# Patient Record
Sex: FEMALE | Race: WHITE
[De-identification: names, ages, dates, MRNs, and addresses within clinical notes are randomized per-mention and may not be internally consistent; named-entity substitution may affect disease eponyms.]

---

## 2017-03-10 ENCOUNTER — HOSPITAL ENCOUNTER (INPATIENT)
Dept: HOSPITAL 31 - C.ER | Age: 22
LOS: 10 days | Discharge: HOME | DRG: 430 | End: 2017-03-20
Attending: PSYCHIATRY & NEUROLOGY | Admitting: PSYCHIATRY & NEUROLOGY
Payer: MEDICAID

## 2017-03-10 DIAGNOSIS — F12.10: ICD-10-CM

## 2017-03-10 DIAGNOSIS — Z91.19: ICD-10-CM

## 2017-03-10 DIAGNOSIS — F25.0: Primary | ICD-10-CM

## 2017-03-10 LAB
ALBUMIN/GLOB SERPL: 1.2 {RATIO} (ref 1–2.1)
ALP SERPL-CCNC: 43 U/L (ref 38–126)
ALT SERPL-CCNC: 28 U/L (ref 9–52)
AST SERPL-CCNC: 17 U/L (ref 14–36)
BACTERIA #/AREA URNS HPF: (no result) /[HPF]
BASOPHILS # BLD AUTO: 0 K/UL (ref 0–0.2)
BASOPHILS NFR BLD: 0.6 % (ref 0–2)
BILIRUB SERPL-MCNC: 0.5 MG/DL (ref 0.2–1.3)
BILIRUB UR-MCNC: NEGATIVE MG/DL
BUN SERPL-MCNC: 9 MG/DL (ref 7–17)
CALCIUM SERPL-MCNC: 8.6 MG/DL (ref 8.6–10.4)
CHLORIDE SERPL-SCNC: 97 MMOL/L (ref 98–107)
CO2 SERPL-SCNC: 30 MMOL/L (ref 22–30)
EOSINOPHIL # BLD AUTO: 0.1 K/UL (ref 0–0.7)
EOSINOPHIL NFR BLD: 1.1 % (ref 0–4)
ERYTHROCYTE [DISTWIDTH] IN BLOOD BY AUTOMATED COUNT: 12.5 % (ref 11.5–14.5)
ETHANOL SERPL-MCNC: < 10 MG/DL (ref 0–10)
GLOBULIN SER-MCNC: 3.3 GM/DL (ref 2.2–3.9)
GLUCOSE SERPL-MCNC: 92 MG/DL (ref 65–105)
GLUCOSE UR STRIP-MCNC: NORMAL MG/DL
HCT VFR BLD CALC: 35.7 % (ref 34–47)
KETONES UR STRIP-MCNC: (no result) MG/DL
LEUKOCYTE ESTERASE UR-ACNC: (no result) LEU/UL
LYMPHOCYTES # BLD AUTO: 2.5 K/UL (ref 1–4.3)
LYMPHOCYTES NFR BLD AUTO: 30.3 % (ref 20–40)
MCH RBC QN AUTO: 30.7 PG (ref 27–31)
MCHC RBC AUTO-ENTMCNC: 35.1 G/DL (ref 33–37)
MCV RBC AUTO: 87.6 FL (ref 81–99)
MONOCYTES # BLD: 0.9 K/UL (ref 0–0.8)
MONOCYTES NFR BLD: 11.1 % (ref 0–10)
NRBC BLD AUTO-RTO: 0 % (ref 0–2)
PH UR STRIP: 6 [PH] (ref 5–8)
PLATELET # BLD: 305 K/UL (ref 130–400)
PMV BLD AUTO: 7.7 FL (ref 7.2–11.7)
POTASSIUM SERPL-SCNC: 3.6 MMOL/L (ref 3.6–5.2)
PROT SERPL-MCNC: 7.1 G/DL (ref 6.3–8.3)
PROT UR STRIP-MCNC: (no result) MG/DL
RBC # UR STRIP: NEGATIVE /UL
RBC #/AREA URNS HPF: 1 /HPF (ref 0–3)
SODIUM SERPL-SCNC: 141 MMOL/L (ref 132–148)
SP GR UR STRIP: 1.02 (ref 1–1.03)
UROBILINOGEN UR-MCNC: NORMAL MG/DL (ref 0.2–1)
WBC # BLD AUTO: 8.2 K/UL (ref 4.8–10.8)
WBC #/AREA URNS HPF: 1 /HPF (ref 0–5)

## 2017-03-10 PROCEDURE — GZ72ZZZ FAMILY PSYCHOTHERAPY: ICD-10-PCS | Performed by: PSYCHIATRY & NEUROLOGY

## 2017-03-10 PROCEDURE — GZ56ZZZ INDIVIDUAL PSYCHOTHERAPY, SUPPORTIVE: ICD-10-PCS | Performed by: PSYCHIATRY & NEUROLOGY

## 2017-03-10 PROCEDURE — GZ3ZZZZ MEDICATION MANAGEMENT: ICD-10-PCS | Performed by: PSYCHIATRY & NEUROLOGY

## 2017-03-11 VITALS — OXYGEN SATURATION: 100 %

## 2017-03-11 RX ADMIN — DIVALPROEX SODIUM SCH MG: 500 TABLET, EXTENDED RELEASE ORAL at 18:35

## 2017-03-11 NOTE — RAD
PROCEDURE:  CHEST RADIOGRAPH, 1 VIEW



HISTORY:

medical screening



COMPARISON:

None available.



FINDINGS:



LUNGS:

Clear.



PLEURA:

No pneumothorax or pleural fluid seen.



CARDIOVASCULAR:

Normal.



OSSEOUS STRUCTURES:

No significant abnormalities.



VISUALIZED UPPER ABDOMEN:

Normal.



OTHER FINDINGS:

None. 



IMPRESSION:

No active disease.

## 2017-03-11 NOTE — PCM.PSYCH
Initial Psychiatric Evaluation





- Initial Psychiatric Evaluation


Type of Admission: Voluntary


Legal Status: Capacity


Chief Complaint (in patient's own words): 


"I'm sleepy"


History of Present Illness and Precipitating Events: 


The patient is seen, chart reviewed and case discussed.


This is a 21-year-old  female, single, no child, unemployed, brought here 

by her family. The patient was living with her brother in [place] for the last 4 

months and getting treatment, but she suddenly came back to [place] without 

letting her family and off. 


Her brother is on vacation and could not be reached.





Her family brought her in because she was acting bizarre, banging her head on 

the wall and getting aggressive. She was also almost catatonic in ER. They 

reported that the patient has a chronic mental illness, schizophrenia or 

bipolar disorder, and was on a depot medication once a month which they don't 

know the name. They do not know why she left [place] but the patient last night 

said that she was upset about an ex-boyfriend getting . However again is 

not clear why she left [place]. The patient would not elaborate to the writer 

either.


The patient states "no" or "I don't know" to most questions including 

suicidality and homicidality and hallucinations/delusions. However, she looks 

internally preoccupied, is our and inappropriate such as she disrobe in the 

hallway and she was also aggressive with staff at times.


Unknown drug hx but UDS is negative





Past psych history: Patient has been hospitalized in the past. Her medications 

are unknown at this point.





Family psych history: Unknown





Medical history: Unknown


Current Medications: 





Active Medications











Generic Name Dose Route Start Last Admin





  Trade Name Freq  PRN Reason Stop Dose Admin


 


Benztropine Mesylate  0.5 mg 03/11/17 10:00 03/11/17 09:31





  Cogentin  PO   0.5 mg





  BID PASQUALE   Administration


 


Divalproex Sodium  500 mg 03/11/17 18:00  





  Depakote Er  PO   





  BID PASQUALE   


 


Haloperidol  5 mg 03/11/17 10:00 03/11/17 09:31





  Haldol  PO   5 mg





  BID PASQUALE   Administration


 


Haloperidol  5 mg 03/11/17 08:31  





  Haldol  PO   





  Q2H PRN   





  agitation, max 4x/24h   


 


Hydroxyzine HCl  25 mg 03/11/17 08:29  





  Atarax  PO   





  Q4H PRN   





  Anxiety   


 


Ibuprofen  600 mg 03/11/17 08:29  





  Motrin Tab  PO   





  Q6H PRN   





  Pain, moderate (4-7)   


 


Lorazepam  1 mg 03/11/17 10:00 03/11/17 09:37





  Ativan  PO   1 mg





  BID PASQUALE   Administration


 


Quetiapine Fumarate  100 mg 03/11/17 22:00  





  Seroquel  PO   





  HS PASQUALE   


 


Trazodone HCl  50 mg 03/11/17 08:29  





  Desyrel  PO   





  HS PRN   





  Insomnia   














Past Psychiatric History





- Past Psychiatric History


Previous Treatment History: Inpatient


Pertinent Medical Hx (Current Medical&Sleep Prob, Allergies): 





 Allergies











Allergy/AdvReac Type Severity Reaction Status Date / Time


 


No Known Allergies Allergy   Verified 11/29/15 08:18








 





Famotidine [Pepcid AC] 20 mg PO DAILY #20 ctb 11/29/15 


Haloperidol Decanoate [Haldol Decanoate--long acting] 1 ml IM ONCE 11/29/15 


Lithium 1 tab PO BID 11/29/15 











Review of Systems





- Neurological


Neurological: Confusion





- Psychiatric


Psychiatric: Abnormal Sleep Pattern, Irritability.  absent: Homicidal Ideation, 

Suicidal Ideation





Mental Status Examination





- Personal Presentation


Personal Presentation: Looks stated age





- Affect


Affect: Flat





- Motor Activity


Motor Activity: Psychomotor Agitation





- Reliability in Providing Information


Reliability in Providing Information: Poor, due to alteration in thoughts





- Speech


Speech: Disorganized, Irrelevant





- Mood


Mood: Anxious





- Formal Thought Process


Formal Thought Process: Loosening of associations





- Cognitive Functions


Orientation: Situation (cannot be assessed due to no cooperation)


Attention/Concentration: Easily distracted


Abstract Thinking: [place]


Estimate of Intelligence: Below average


Judgement: Imparied, as evidence by: Poor judgement


Memory: Recent impaired, as evidence by: Inability to recall events of the day, 

Remote impaired as evidenced by: Inability to recall sig life events





- Risk


Risk: Diminished functioning, Other (catatonia)





- Strength & Assets Inventory


Strength & Assets Inventory: Family support





DSM 5 DX





- DSM 5


DSM 5 Diagnosis: 


Schizoaffective d/o - bipolar type





- Recommended/Plan of Treatment


Treatment Recommendations and Plan of Treatment: 


Haldol 5 mg BID for psychosis


Depakote  mg BID for mood sxs


Cogentin 0.5 mg BID for SEs


Ativan 1 mg BID for catatonic sxs/anxiety


Seroquel 50 hs for insomnia





Collateral info from family re her depot med


Close observation


Support and psychoed


family meeting


Refer to [facility] on d/c





33 min


Projected ELOS: 7 days


Prognosis: good with treatment


Discharge Plan and Discharge Criteria: 


No psychosis or frederic, no catatonic sxs


refer to DTP

## 2017-03-12 RX ADMIN — DIVALPROEX SODIUM SCH MG: 500 TABLET, EXTENDED RELEASE ORAL at 17:34

## 2017-03-12 RX ADMIN — DIVALPROEX SODIUM SCH MG: 500 TABLET, EXTENDED RELEASE ORAL at 09:08

## 2017-03-12 NOTE — PCM.PYCHPN
Psychiatric Progress Note





- Psychiatric Progress Note


Patient seen today, length of contact: 16 min


Patient Chief Complaint: 


"I'm OK"


Problems Identified/Issues Discussed: 


The pt is seen, chart reviewed, case discussed with staff.


The pt is compliant with medications and reports no side-effects.


Symptoms are improving slowly but needs more time to stabilize. 


She is however still disorganized, less catatonic. Singing loudly in the 

hallways - redirectable. 


She claimed she came on her own but a friend helped her (from [place] to [place]) and 

that she will NOT go back and will stay with her mother..


She is OK with attending [facility].


Medication Change: Yes (adjust ativan)


Medical Record Reviewed: Yes





Mental Status Examination





- Cognitive Function


Orientation: Person, Place, Situation, Time


Memory: Impaired


Attention: Poor


Concentration: Poor


Association: Loose


Fund of Knowledge: Poor





- Mood


Mood: Anxious





- Affect


Affect: Constricted





- Speech


Speech: Loud





- Formal Thought Process


Formal Thought Process: Paranoia, Loosening of associations





Goal/Treatment Plan





- Goal/Treatment Plan


Need for Continued Stay: Discharge may exacerbated symptoms, Severe functional 

impairment


Progress Toward Problem(s) and Goals/Treatment Plan: 


Haldol 5 mg BID for psychosis


Depakote  mg BID for mood sxs


Cogentin 0.5 mg BID for SEs


Ativan 0.5 mg TID for catatonic sxs/anxiety


Seroquel 50 hs for insomnia





Collateral info from family re her depot med


Close observation


Support and psychoed


family meeting


Refer to [facility] on d/c





Estimated Date of D/C: 03/17/17

## 2017-03-13 RX ADMIN — DIVALPROEX SODIUM SCH MG: 500 TABLET, EXTENDED RELEASE ORAL at 18:14

## 2017-03-13 RX ADMIN — DIVALPROEX SODIUM SCH MG: 500 TABLET, EXTENDED RELEASE ORAL at 09:32

## 2017-03-13 NOTE — PCM.PYCHPN
Psychiatric Progress Note





- Psychiatric Progress Note


Patient seen today, length of contact: 16 min


Patient Chief Complaint: 


I want to leave


Problems Identified/Issues Discussed: 





Patient seen and evaluated, chart reviewed and discussed with the nurse. 

Patient remained disorganized and internally preoccupied. Patient reports 

irritability, anxiety and agitation. Patient still appears paranoid and 

delusional. She denies hearing any voices. She is taking medication and denied 

any side effects.





Supportive therapy and psychoeducation were given.


Medication Change: Yes (Increase Haldol)


Medical Record Reviewed: Yes





Mental Status Examination





- Cognitive Function


Orientation: Person, Place, Situation, Time


Memory: Impaired


Attention: Poor


Concentration: Poor


Association: Loose


Fund of Knowledge: Poor





- Mood


Mood: Anxious





- Affect


Affect: Constricted





- Speech


Speech: Loud





- Formal Thought Process


Formal Thought Process: Delusions, Paranoia, Loosening of associations





- Suicidal Ideation


Suicidal Ideation: No





- Homicidal Ideation


Homicidal Ideation: No





Goal/Treatment Plan





- Goal/Treatment Plan


Need for Continued Stay: Discharge may exacerbated symptoms, Severe functional 

impairment


Progress Toward Problem(s) and Goals/Treatment Plan: 


 Schizoaffective disorder bipolar type


 CBT


Psychoeducation


Supportive therapy


Haldol 5 mg PO Daily


Increase Haldol 10 mg at bedtime


Depakote  mg BID for mood sxs


Cogentin 0.5 mg BID for SEs


Ativan 1 mg BID for catatonic sxs/anxiety


Seroquel 50 hs for insomnia





Collateral info from family re her depot med


Close observation


Support and psychoed


family meeting


Refer to [facility] on d/c





Estimated Date of D/C: 03/17/17





- Smoking Cessation


Smoking Cessation Initiated: No

## 2017-03-14 RX ADMIN — DIVALPROEX SODIUM SCH MG: 500 TABLET, EXTENDED RELEASE ORAL at 17:21

## 2017-03-14 RX ADMIN — DIVALPROEX SODIUM SCH MG: 500 TABLET, EXTENDED RELEASE ORAL at 10:51

## 2017-03-14 NOTE — CARD
--------------- APPROVED REPORT --------------





EKG Measurement

Heart [id number]

[id number] [id number]

[id number]

[id number]

[id number]



<Conclusion>

Sinus bradycardia

Early repolarization

Otherwise normal ECG

## 2017-03-14 NOTE — PCM.PYCHPN
Psychiatric Progress Note





- Psychiatric Progress Note


Patient seen today, length of contact: 17 min


Patient Chief Complaint: 


I'm feeling better


Problems Identified/Issues Discussed: 





Patient seen and evaluated, chart reviewed and discussed with the nurse. As per 

the staff patient is taking her medications but still wandering in the 

hallways. Today patient appeared a bit organized and kempt, but remained 

internally preoccupied. Patient reports improvement in her mood  but still 

appears paranoid and delusional. She is taking medication and denied any side 

effects.





Supportive therapy and psychoeducation were given.


Medication Change: Yes (Increase Haldol)


Medical Record Reviewed: Yes





Mental Status Examination





- Cognitive Function


Orientation: Person, Place, Situation, Time


Memory: Impaired


Attention: Poor


Concentration: Poor


Association: Loose


Fund of Knowledge: Poor





- Mood


Mood: Anxious





- Affect


Affect: Constricted





- Speech


Speech: Loud





- Formal Thought Process


Formal Thought Process: Delusions, Paranoia, Loosening of associations





- Suicidal Ideation


Suicidal Ideation: No





- Homicidal Ideation


Homicidal Ideation: No





Goal/Treatment Plan





- Goal/Treatment Plan


Need for Continued Stay: Discharge may exacerbated symptoms, Severe functional 

impairment


Progress Toward Problem(s) and Goals/Treatment Plan: 


 Schizoaffective disorder bipolar type


 CBT


Psychoeducation


Supportive therapy


Increase Haldol 10 mg PO Daily


Haldol 10 mg at bedtime


Depakote  mg BID for mood sxs


Cogentin 0.5 mg BID for SEs


Ativan 1 mg BID for catatonic sxs/anxiety


Seroquel 50 hs for insomnia





Collateral info from family re her depot med


Close observation


Support and psychoed


family meeting


Refer to [facility] on d/c





Estimated Date of D/C: 03/17/17





- Smoking Cessation


Smoking Cessation Initiated: No

## 2017-03-15 RX ADMIN — DIVALPROEX SODIUM SCH MG: 500 TABLET, EXTENDED RELEASE ORAL at 17:02

## 2017-03-15 RX ADMIN — DIVALPROEX SODIUM SCH MG: 500 TABLET, EXTENDED RELEASE ORAL at 10:14

## 2017-03-15 NOTE — PCM.PYCHPN
Psychiatric Progress Note





- Psychiatric Progress Note


Patient seen today, length of contact: 17 min


Patient Chief Complaint: 


I'm feeling better


Problems Identified/Issues Discussed: 





Patient seen and evaluated, chart reviewed and discussed with the nurse. As per 

the staff patient is taking her medications but still wandering in the 

hallways. Today patient appeared a bit organized and kempt, but remained 

internally preoccupied. Patient reports improvement in her mood  but still 

appears paranoid and delusional. She is taking medication and denied any side 

effects.





Supportive therapy and psychoeducation were given.


Medication Change: Yes (Increase Haldol)


Medical Record Reviewed: Yes





Mental Status Examination





- Cognitive Function


Orientation: Person, Place, Situation, Time


Memory: Impaired


Attention: Poor


Concentration: Poor


Association: Loose


Fund of Knowledge: Poor





- Mood


Mood: Anxious





- Affect


Affect: Constricted





- Speech


Speech: Loud





- Formal Thought Process


Formal Thought Process: Delusions, Paranoia, Loosening of associations





- Suicidal Ideation


Suicidal Ideation: No





- Homicidal Ideation


Homicidal Ideation: No





Goal/Treatment Plan





- Goal/Treatment Plan


Need for Continued Stay: Discharge may exacerbated symptoms, Severe functional 

impairment


Progress Toward Problem(s) and Goals/Treatment Plan: 


 Schizoaffective disorder bipolar type


 CBT


Psychoeducation


Supportive therapy


Increase Haldol 10 mg PO Daily


Haldol 10 mg at bedtime


Depakote  mg BID for mood sxs


Cogentin 0.5 mg BID for SEs


Ativan 1 mg BID for catatonic sxs/anxiety


Seroquel 50 hs for insomnia





Collateral info from family re her depot med


Close observation


Support and psychoed


family meeting


Refer to [facility] on d/c





Estimated Date of D/C: 03/17/17

## 2017-03-16 RX ADMIN — DIVALPROEX SODIUM SCH MG: 500 TABLET, EXTENDED RELEASE ORAL at 10:06

## 2017-03-16 RX ADMIN — DIVALPROEX SODIUM SCH MG: 500 TABLET, EXTENDED RELEASE ORAL at 17:23

## 2017-03-16 NOTE — PCM.PYCHPN
Psychiatric Progress Note





- Psychiatric Progress Note


Patient seen today, length of contact: 17 min


Patient Chief Complaint: 


I'm feeling much better


Problems Identified/Issues Discussed: 


Patient seen and evaluated, chart reviewed and discussed with the nurse. As per 

the staff patient is taking her medications and there were no noted episodes of 

psychosis. Today patient appeared organized and kempt, she was well dressed but 

remained internally preoccupied with a strong desire to return home to her 

mother's apartment. The pt denies continuing to have a constant flow of 

thoughts race through her head today.  Patient reports improvement in her mood  

but still appears paranoid and delusional. She is taking medication and denied 

any side effects.





Supportive therapy and psychoeducation were given.


Medication Change: Yes (Increase Haldol)


Medical Record Reviewed: Yes





Mental Status Examination





- Cognitive Function


Orientation: Person, Place, Situation, Time


Memory: Impaired


Attention: Poor


Concentration: Poor


Association: Loose


Fund of Knowledge: Poor





- Mood


Mood: Anxious





- Affect


Affect: Constricted





- Speech


Speech: Loud





- Formal Thought Process


Formal Thought Process: Delusions, Paranoia, Loosening of associations





- Suicidal Ideation


Suicidal Ideation: No





- Homicidal Ideation


Homicidal Ideation: No





Goal/Treatment Plan





- Goal/Treatment Plan


Need for Continued Stay: Discharge may exacerbated symptoms, Severe functional 

impairment


Progress Toward Problem(s) and Goals/Treatment Plan: 


 Schizoaffective disorder bipolar type


 CBT


Psychoeducation


Supportive therapy


Increase Haldol 10 mg PO Daily


Haldol 10 mg at bedtime


Depakote  mg BID for mood sxs


Cogentin 0.5 mg BID for SEs


Ativan 1 mg BID for catatonic sxs/anxiety


Seroquel 50 hs for insomnia





Collateral info from family re her depot med


Close observation


Support and psychoed


family meeting


Refer to [facility] on d/c





Estimated Date of D/C: 03/17/17

## 2017-03-17 RX ADMIN — DIVALPROEX SODIUM SCH MG: 500 TABLET, EXTENDED RELEASE ORAL at 17:04

## 2017-03-17 RX ADMIN — DIVALPROEX SODIUM SCH MG: 500 TABLET, EXTENDED RELEASE ORAL at 10:29

## 2017-03-17 NOTE — PCM.PYCHPN
Psychiatric Progress Note





- Psychiatric Progress Note


Patient seen today, length of contact: 17 min


Patient Chief Complaint: 


I'm feeling much better


Problems Identified/Issues Discussed: 


Patient seen and evaluated, chart reviewed and discussed with the nurse. As per 

the staff pt still appeared less delusional and less internally preoccupied. 

However, pt remained calm and cooperative. Patient reports improvement in the 

hallucinations but still reports, 'multiple voices in the head that are talking 

to her and are very mean to her.' Pt remained isolated, withdrawn and remained 

confined to his room. Patient still appears paranoid and delusional. She is 

taking medication and tolerating very well.





Spoke to the mother and informed her about the discharge plan on Monday.








Medication Change: No


Medical Record Reviewed: Yes





Mental Status Examination





- Cognitive Function


Orientation: Person, Place, Situation, Time


Memory: Impaired


Attention: WNL


Concentration: WNL


Association: Loose


Fund of Knowledge: Poor





- Mood


Mood: Anxious





- Affect


Affect: Constricted





- Speech


Speech: Loud





- Formal Thought Process


Formal Thought Process: Hallucinations, Paranoia, Loosening of associations





- Suicidal Ideation


Suicidal Ideation: No





- Homicidal Ideation


Homicidal Ideation: No





Goal/Treatment Plan





- Goal/Treatment Plan


Need for Continued Stay: Discharge may exacerbated symptoms, Severe functional 

impairment


Progress Toward Problem(s) and Goals/Treatment Plan: 


 Schizoaffective disorder bipolar type


 CBT


Psychoeducation


Supportive therapy


Haldol 10 mg PO Daily


Haldol 10 mg at bedtime


Depakote  mg BID for mood sxs


Cogentin 0.5 mg BID for SEs


Ativan 1 mg BID for catatonic sxs/anxiety


Seroquel 50 hs for insomnia





Collateral info from family re her depot med


Close observation


Support and psychoed


family meeting


Refer to [facility] on d/c





Estimated Date of D/C: 03/17/17





- Smoking Cessation


Smoking Cessation Initiated: No

## 2017-03-18 RX ADMIN — DIVALPROEX SODIUM SCH MG: 500 TABLET, EXTENDED RELEASE ORAL at 19:05

## 2017-03-18 RX ADMIN — DIVALPROEX SODIUM SCH MG: 500 TABLET, EXTENDED RELEASE ORAL at 10:22

## 2017-03-18 NOTE — PCM.PYCHPN
Psychiatric Progress Note





- Psychiatric Progress Note


Patient seen today, length of contact: 15 minutes


Patient Chief Complaint: 


I'm feeling better today


Problems Identified/Issues Discussed: 


Patient seen. Chart reviewed. Case discussed with the staff. Issues related to 

illness and treatment were discussed with the patient. Reported compliant with 

treatment with no adverse affects. Tolerating treatment very well. Patient 

reported feeling much better. According to staff, patient his more clear in her 

thoughts, less isolative and more cooperative. At the time of evaluation, 

patient was awake alert oriented 3, had no delusions, no auditory or visual 

hallucinations, no suicidal ideations or homicidal ideations.


Medical Problems: 


None reported


Diagnostic Results: 


Reviewed


DSM 5 Symptoms Update: 


Improving with treatment


Medication Change: No


Medical Record Reviewed: Yes





Mental Status Examination





- Cognitive Function


Orientation: Person, Place, Situation, Time


Memory: Intact


Attention: WNL


Concentration: WNL


Association: WNL


Fund of Knowledge: [facility]


Decription of patient's judgement and insights: 


Fair





- Mood


Mood: Anxious





- Affect


Affect: Constricted





- Speech


Speech: Soft





- Formal Thought Process


Formal Thought Process: Other





- Suicidal Ideation


Suicidal Ideation: No





- Homicidal Ideation


Homicidal Ideation: No





Goal/Treatment Plan





- Goal/Treatment Plan


Need for Continued Stay: Remain at risks for inpatient hospitalization, 

Discharge may exacerbated symptoms, Severe functional impairment


Progress Toward Problem(s) and Goals/Treatment Plan: 


Improving with treatment


Patient education


Supportive therapy


Continue treatment as before


Estimated Date of D/C: 03/20/17





- Smoking Cessation


Smoking Cessation Initiated: No

## 2017-03-19 RX ADMIN — DIVALPROEX SODIUM SCH MG: 500 TABLET, EXTENDED RELEASE ORAL at 17:33

## 2017-03-19 RX ADMIN — DIVALPROEX SODIUM SCH MG: 500 TABLET, EXTENDED RELEASE ORAL at 09:01

## 2017-03-19 NOTE — PCM.PYCHPN
Psychiatric Progress Note





- Psychiatric Progress Note


Patient seen today, length of contact: 15 minutes


Patient Chief Complaint: 


I'm feeling better. Can I go home today


Problems Identified/Issues Discussed: 


Patient seen. Chart reviewed. Case discussed with the staff. Issues related to 

illness and treatment were discussed with the patient. Reported compliant with 

treatment with no adverse affects. Tolerating treatment very well. Patient 

reported feeling much better. According to staff, patient his more clear in her 

thoughts, less isolative and more cooperative. At the time of evaluation, 

patient was awake alert oriented 3, had no delusions, no auditory or visual 

hallucinations, no suicidal ideations or homicidal ideations.


Medical Problems: 


None reported


Diagnostic Results: 


Reviewed


DSM 5 Symptoms Update: 


Improving with treatment


Medication Change: No


Medical Record Reviewed: Yes





Mental Status Examination





- Cognitive Function


Orientation: Person, Place, Situation, Time


Memory: Intact


Attention: WNL


Concentration: WNL


Association: WNL


Fund of Knowledge: [facility]


Decription of patient's judgement and insights: 


Fair





- Mood


Mood: Anxious





- Affect


Affect: Other (Appropriate)





- Speech


Speech: Soft





- Formal Thought Process


Formal Thought Process: No Impairment


Psychotic Thoughts and Behaviors: 


None





- Suicidal Ideation


Suicidal Ideation: No





- Homicidal Ideation


Homicidal Ideation: No





Goal/Treatment Plan





- Goal/Treatment Plan


Need for Continued Stay: Remain at risks for inpatient hospitalization, 

Discharge may exacerbated symptoms, Severe functional impairment


Progress Toward Problem(s) and Goals/Treatment Plan: 


Improving with treatment


Patient education


Supportive therapy


Continue treatment as before


Estimated Date of D/C: 03/20/17





- Smoking Cessation


Smoking Cessation Initiated: No

## 2017-03-20 VITALS
SYSTOLIC BLOOD PRESSURE: 98 MMHG | RESPIRATION RATE: 18 BRPM | TEMPERATURE: 96.9 F | HEART RATE: 69 BPM | DIASTOLIC BLOOD PRESSURE: 69 MMHG

## 2017-03-20 RX ADMIN — DIVALPROEX SODIUM SCH MG: 500 TABLET, EXTENDED RELEASE ORAL at 09:35

## 2017-03-20 NOTE — PCM.PYCHDC
Mental Status Examination





- Mental Status Examination


Orientation: Person, Place, Situation, Time


Memory: Intact


Mood: Neutral


Affect: Constricted


Speech: Soft


Attention: WNL


Concentration: WNL


Association: WNL


Fund of Knowledge: WNL


Formal Thought Process: No Impairment


Description of patient's judgement and insight: 


good, fair


Psychotic Thoughts and Behaviors: 


denies any AVH


Suicidal Ideation: No


Current Homicidal Ideation?: No





Discharge Summary





- Discharge Note


Reason for Hospitalization: 


This is a 21-year-old  female, single, no child, unemployed, brought here 

by her family. The patient was living with her brother in [place] for the last 4 

months and getting treatment, but she suddenly came back to [place] without 

letting her family and off. 


Her brother is on vacation and could not be reached.





Her family brought her in because she was acting bizarre, banging her head on 

the wall and getting aggressive. She was also almost catatonic in ER. They 

reported that the patient has a chronic mental illness, schizophrenia or 

bipolar disorder, and was on a depot medication once a month which they don't 

know the name. They do not know why she left [place] but the patient last night 

said that she was upset about an ex-boyfriend getting . However again is 

not clear why she left [place]. The patient would not elaborate to the writer 

either.


The patient states "no" or "I don't know" to most questions including 

suicidality and homicidality and hallucinations/delusions. However, she looks 

internally preoccupied, is our and inappropriate such as she disrobe in the 

hallway and she was also aggressive with staff at times.


Unknown drug hx but UDS is negative








Consultations:: List each consultation separately and include:  1. Reason for 

request.  2. Findings.  3. Follow-up


Summary of Hospital Course include:: 1. Description of specific treatment plan 

utilized for patients during their course of treatmen.  2. Summarize the time-

course for resolution of acute symptoms and/or regressed behaviors.  3. 

Describe issues identified and worked on during hospitalization.  4. Describe 

medication utilized.  5. Describe medical problems identified and treated.  6. 

Reassessment of suicide risk


Summary of Hospital Course: 


During the course of her stay, patient (pt) started progressively improving and 

she no longer remained irritable, anxious and paranoid. Her mood and paranoia 

were improved and she started attending groups and meetings and started 

socializing. 





Patient denied any feelings of hopelessness, helplessness, and worthlessness, 

denied any problem with the sleep or appetite, denied suicidal ideation or 

homicidal ideation. Pt denied any auditory or visual hallucinations.  





Some changes were made in her current medications and patient was discharged on 

following medications. She tolerated these medications very well and denied any 

side effects. 








- Final Diagnosis (DSM 5)


Condition upon Discharge: FAIR


DSM 5: 


Schizoaffective disorder bipolar type


Marihuana use disorder moderate





Disposition: HOME/ ROUTINE


Follow-up Treatment Plan: 





Education:


Pt was educated and counseled about the risks and benefits of taking and not 

taking medications.  





Pt was educated and counseled about the risks of drinking and abusing drugs.   





Pt was educated and counseled to go to the ER or call 911 if pt develop 

suicidal ideation or homicidal ideation, worsening of symptoms or severe side 

effects of the meds.


   





Prescriptions/Medication Reconciliation: 


Benztropine [Cogentin] 0.5 mg PO BID #60 tab


Divalproex [Depakote ER] 500 mg PO BID #60 ter


Haloperidol [Haldol] 10 mg PO BID #60 tab


QUEtiapine [SEROquel] 50 mg PO HS #30 tab





- Smoking Cessation


Smoking Cessation Medication prescribed: No





- Antipsychotic Medications


Pt discharged on 2 or more routine antipsychotic medications: Yes

## 2017-08-20 ENCOUNTER — HOSPITAL ENCOUNTER (EMERGENCY)
Dept: HOSPITAL 31 - C.ER | Age: 22
Discharge: HOME | End: 2017-08-20
Payer: MEDICAID

## 2017-08-20 VITALS
DIASTOLIC BLOOD PRESSURE: 81 MMHG | HEART RATE: 61 BPM | SYSTOLIC BLOOD PRESSURE: 121 MMHG | RESPIRATION RATE: 20 BRPM | TEMPERATURE: 98.4 F

## 2017-08-20 VITALS — OXYGEN SATURATION: 99 %

## 2017-08-20 DIAGNOSIS — R19.7: Primary | ICD-10-CM

## 2017-08-20 LAB
ALBUMIN/GLOB SERPL: 1.2 {RATIO} (ref 1–2.1)
ALP SERPL-CCNC: 63 U/L (ref 38–126)
ALT SERPL-CCNC: 35 U/L (ref 9–52)
AST SERPL-CCNC: 24 U/L (ref 14–36)
BASOPHILS # BLD AUTO: 0 K/UL (ref 0–0.2)
BASOPHILS NFR BLD: 0.5 % (ref 0–2)
BILIRUB SERPL-MCNC: 0.7 MG/DL (ref 0.2–1.3)
BILIRUB UR-MCNC: NEGATIVE MG/DL
BUN SERPL-MCNC: 10 MG/DL (ref 7–17)
CALCIUM SERPL-MCNC: 9.2 MG/DL (ref 8.6–10.4)
CHLORIDE SERPL-SCNC: 102 MMOL/L (ref 98–107)
CO2 SERPL-SCNC: 26 MMOL/L (ref 22–30)
EOSINOPHIL # BLD AUTO: 0 K/UL (ref 0–0.7)
EOSINOPHIL NFR BLD: 0.2 % (ref 0–4)
ERYTHROCYTE [DISTWIDTH] IN BLOOD BY AUTOMATED COUNT: 11.4 % (ref 11.5–14.5)
GLOBULIN SER-MCNC: 3.3 GM/DL (ref 2.2–3.9)
GLUCOSE SERPL-MCNC: 90 MG/DL (ref 65–105)
GLUCOSE UR STRIP-MCNC: NORMAL MG/DL
HCT VFR BLD CALC: 36.6 % (ref 34–47)
KETONES UR STRIP-MCNC: NEGATIVE MG/DL
LEUKOCYTE ESTERASE UR-ACNC: (no result) LEU/UL
LYMPHOCYTES # BLD AUTO: 2.1 K/UL (ref 1–4.3)
LYMPHOCYTES NFR BLD AUTO: 24.6 % (ref 20–40)
MAGNESIUM SERPL-MCNC: 1.7 MG/DL (ref 1.6–2.3)
MCH RBC QN AUTO: 30.1 PG (ref 27–31)
MCHC RBC AUTO-ENTMCNC: 33.4 G/DL (ref 33–37)
MCV RBC AUTO: 90.4 FL (ref 81–99)
MONOCYTES # BLD: 0.4 K/UL (ref 0–0.8)
MONOCYTES NFR BLD: 5.2 % (ref 0–10)
NRBC BLD AUTO-RTO: 0 % (ref 0–2)
PH UR STRIP: 6 [PH] (ref 5–8)
PHOSPHATE SERPL-MCNC: 3.2 MG/DL (ref 2.5–4.5)
PLATELET # BLD: 295 K/UL (ref 130–400)
PMV BLD AUTO: 8 FL (ref 7.2–11.7)
POTASSIUM SERPL-SCNC: 3.9 MMOL/L (ref 3.6–5.2)
PROT SERPL-MCNC: 7.2 G/DL (ref 6.3–8.3)
PROT UR STRIP-MCNC: NEGATIVE MG/DL
RBC # UR STRIP: NEGATIVE /UL
RBC #/AREA URNS HPF: 1 /HPF (ref 0–3)
SODIUM SERPL-SCNC: 138 MMOL/L (ref 132–148)
SP GR UR STRIP: 1.01 (ref 1–1.03)
UROBILINOGEN UR-MCNC: NORMAL MG/DL (ref 0.2–1)
WBC # BLD AUTO: 8.6 K/UL (ref 4.8–10.8)
WBC #/AREA URNS HPF: < 1 /HPF (ref 0–5)

## 2017-08-20 PROCEDURE — 85025 COMPLETE CBC W/AUTO DIFF WBC: CPT

## 2017-08-20 PROCEDURE — 84100 ASSAY OF PHOSPHORUS: CPT

## 2017-08-20 PROCEDURE — 96374 THER/PROPH/DIAG INJ IV PUSH: CPT

## 2017-08-20 PROCEDURE — 84703 CHORIONIC GONADOTROPIN ASSAY: CPT

## 2017-08-20 PROCEDURE — 83735 ASSAY OF MAGNESIUM: CPT

## 2017-08-20 PROCEDURE — 80053 COMPREHEN METABOLIC PANEL: CPT

## 2017-08-20 PROCEDURE — 99285 EMERGENCY DEPT VISIT HI MDM: CPT

## 2017-08-20 PROCEDURE — 81001 URINALYSIS AUTO W/SCOPE: CPT

## 2017-08-20 PROCEDURE — 96375 TX/PRO/DX INJ NEW DRUG ADDON: CPT

## 2017-08-20 PROCEDURE — 83690 ASSAY OF LIPASE: CPT

## 2017-08-20 PROCEDURE — 96361 HYDRATE IV INFUSION ADD-ON: CPT

## 2017-08-20 NOTE — C.PDOC
History Of Present Illness





Patient is a 22 y/o F presenting with 3 day history of periumbilical abdominal 

pain and NB watery diarrhea.  She describes the pain as an intermittent crampy 

pain that does not radiate.  She reports that the pain is improved with 

defecation.  Denies recent antibiotic use.  Denies recent travel outside [place]. 

Denies fever/chills, nausea/vomiting, dysuria, vaginal bleeding or discharge.  

She also reports hx of bipolar disorder and reports that she does not have any 

current psychiatric follow-up and would like to speak to psych to help arrange 

this.  Denies HI/SI, AH/VH.


Time Seen by Provider: 08/20/17 12:52


Chief Complaint (Nursing): Abdominal Pain





Past Medical History


Vital Signs: 


 Last Vital Signs











Temp  98.5 F   08/20/17 12:47


 


Pulse  67   08/20/17 12:47


 


Resp  18   08/20/17 12:47


 


BP  121/84   08/20/17 12:47


 


Pulse Ox  99   08/20/17 14:04














- Medical History


PMH: Bipolar Disorder, Depression, Schizophrenia





- [facility] Procedures








FAMILY PSYCHOTHERAPY (03/10/17)


INDIVIDUAL PSYCHOTHERAPY, SUPPORTIVE (03/10/17)


MEDICATION MANAGEMENT (03/10/17)


PSYCHIA INTERV/EVAL NEC (06/16/15)








Family History: States: Unknown Family Hx





- Social History


Hx Tobacco Use: No


Hx Alcohol Use: No


Hx Substance Use: No





- Immunization History


Hx Tetanus Toxoid Vaccination: No


Hx Influenza Vaccination: No


Hx Pneumococcal Vaccination: No





Review Of Systems


Constitutional: Negative for: Fever, Chills


Eyes: Negative for: Pain


Cardiovascular: Negative for: Chest Pain, Palpitations, Orthopnea, Edema, Light 

Headedness


Respiratory: Negative for: Cough, Shortness of Breath, SOB with Excertion, 

Wheezing


Gastrointestinal: Positive for: Abdominal Pain, Diarrhea.  Negative for: Nausea

, Vomiting, Constipation, Melena, Rectal Pain


Genitourinary: Negative for: Dysuria, Frequency, Hematuria, Vaginal Discharge, 

Vaginal Bleeding, Pelvic Pain


Musculoskeletal: Negative for: Neck Pain


Skin: Negative for: Rash


Neurological: Negative for: Weakness, Numbness, Seizures, Altered Mental Status

, Headache


Psych: Negative for: Depression, Suicidal ideation





Physical Exam





- Physical Exam


Appears: Well, Non-toxic, No Acute Distress


Skin: Normal Color, Warm, Dry


Head: Atraumatic, Normacephalic


Eye(s): bilateral: Normal Inspection, PERRL, EOMI


Chest: Symmetrical


Cardiovascular: Rhythm Regular


Respiratory: Normal Breath Sounds, No Rales, No Rhonchi, No Stridor, No Wheezing


Gastrointestinal/Abdominal: Soft, No Tenderness, No Mass, No Distention


Back: Normal Inspection, No CVA Tenderness


Extremity: Normal ROM, No Tenderness, No Swelling


Neurological/Psych: Oriented x3, Normal Speech, Normal Cranial Nerves, Normal 

Motor


Gait: Steady





ED Course And Treatment





- Laboratory Results


Result Diagrams: 


 08/20/17 13:38





 08/20/17 13:38


O2 Sat by Pulse Oximetry: 99





Medical Decision Making


Medical Decision Making: 





Spoke to psych who will evaluate to provide outpatient follow-up.  Abdomen soft 

NT/ND.  Will get labs and ua and reeval





2:02PM


Pregnancy negative.  UA negative.  Labs grossly normal.  Abdomen continues to 

be soft NT/ND.  She is tolerating po and feels well.  She has had no additional 

diarrhea in ED.  She was given psychiatric follow-up.





Disposition





- Disposition


Disposition: HOME/ ROUTINE


Disposition Time: 14:03


Condition: GOOD


Additional Instructions: 


Follow up with psychiatry as arranged.  Follow-up with PMD within 2 days. 

Return to ED if condition worsens.  


Instructions:  Acute Diarrhea (ED)


Forms:  [facility] (English)





- Clinical Impression


Clinical Impression: 


 Diarrhea

## 2017-08-24 ENCOUNTER — HOSPITAL ENCOUNTER (INPATIENT)
Dept: HOSPITAL 14 - H.ER | Age: 22
LOS: 5 days | Discharge: PSYCHIATRIC OR SUBSTANCE USE FACILITY | DRG: 430 | End: 2017-08-29
Attending: PSYCHIATRY & NEUROLOGY | Admitting: PSYCHIATRY & NEUROLOGY
Payer: MEDICAID

## 2017-08-24 VITALS — OXYGEN SATURATION: 100 %

## 2017-08-24 DIAGNOSIS — F31.9: Primary | ICD-10-CM

## 2017-08-24 LAB
ALBUMIN/GLOB SERPL: 1.3 {RATIO} (ref 1–2.1)
ALP SERPL-CCNC: 58 U/L (ref 38–126)
ALT SERPL-CCNC: 33 U/L (ref 9–52)
AST SERPL-CCNC: 21 U/L (ref 14–36)
BACTERIA #/AREA URNS HPF: (no result) /[HPF]
BASOPHILS # BLD AUTO: 0.1 K/UL (ref 0–0.2)
BASOPHILS NFR BLD: 0.6 % (ref 0–2)
BILIRUB SERPL-MCNC: 0.9 MG/DL (ref 0.2–1.3)
BILIRUB UR-MCNC: NEGATIVE MG/DL
BUN SERPL-MCNC: 12 MG/DL (ref 7–17)
CALCIUM SERPL-MCNC: 9 MG/DL (ref 8.4–10.2)
CHLORIDE SERPL-SCNC: 105 MMOL/L (ref 98–107)
CO2 SERPL-SCNC: 22 MMOL/L (ref 22–30)
COLOR UR: YELLOW
EOSINOPHIL # BLD AUTO: 0.1 K/UL (ref 0–0.7)
EOSINOPHIL NFR BLD: 0.9 % (ref 0–4)
ERYTHROCYTE [DISTWIDTH] IN BLOOD BY AUTOMATED COUNT: 11.7 % (ref 11.5–14.5)
ETHANOL SERPL-MCNC: < 10 MG/DL (ref 0–10)
GLOBULIN SER-MCNC: 3.3 GM/DL (ref 2.2–3.9)
GLUCOSE SERPL-MCNC: 115 MG/DL (ref 65–105)
GLUCOSE UR STRIP-MCNC: 50 MG/DL
HCT VFR BLD CALC: 38 % (ref 34–47)
KETONES UR STRIP-MCNC: NEGATIVE MG/DL
LEUKOCYTE ESTERASE UR-ACNC: (no result) LEU/UL
LYMPHOCYTES # BLD AUTO: 2.7 K/UL (ref 1–4.3)
LYMPHOCYTES NFR BLD AUTO: 32.3 % (ref 20–40)
MCH RBC QN AUTO: 30.6 PG (ref 27–31)
MCHC RBC AUTO-ENTMCNC: 33.9 G/DL (ref 33–37)
MCV RBC AUTO: 90.1 FL (ref 81–99)
MONOCYTES # BLD: 0.6 K/UL (ref 0–0.8)
MONOCYTES NFR BLD: 7.7 % (ref 0–10)
NEUTROPHILS # BLD: 4.9 K/UL (ref 1.8–7)
NEUTROPHILS NFR BLD AUTO: 58.5 % (ref 50–75)
NRBC BLD AUTO-RTO: 0.2 % (ref 0–0)
PH UR STRIP: 6 [PH] (ref 5–8)
PLATELET # BLD: 322 K/UL (ref 130–400)
PMV BLD AUTO: 8 FL (ref 7.2–11.7)
POTASSIUM SERPL-SCNC: 4.2 MMOL/L (ref 3.6–5)
PROT SERPL-MCNC: 7.5 G/DL (ref 6.3–8.2)
PROT UR STRIP-MCNC: 100 MG/DL
RBC # UR STRIP: NEGATIVE /UL
RBC #/AREA URNS HPF: 3 /HPF (ref 0–3)
SODIUM SERPL-SCNC: 139 MMOL/L (ref 132–148)
SP GR UR STRIP: 1.03 (ref 1–1.03)
UROBILINOGEN UR-MCNC: (no result) MG/DL (ref 0.2–1)
WBC # BLD AUTO: 8.3 K/UL (ref 4.8–10.8)
WBC #/AREA URNS HPF: 3 /HPF (ref 0–5)

## 2017-08-24 PROCEDURE — GZHZZZZ GROUP PSYCHOTHERAPY: ICD-10-PCS | Performed by: PSYCHIATRY & NEUROLOGY

## 2017-08-24 NOTE — PCM.BM
<Chica Barnes - Last Filed: 08/24/17 21:03>





Treatment Plan Problems





- Problems identified on initial assessmt


  ** Altered thought process


Date Initiated: 08/24/17


Time Initiated: 21:11


Assessment reference: NA


Status: Active





  ** Altered Sleep PAttern


Date Initiated: 08/24/17


Time Initiated: 21:10


Assessment reference: NA


Status: Active





Treatment assets and liabiliti


Patient Assests: ADL independent, physically healthy, good past tx response


Patient Liabilities: live alone





- Milieu Protocol


Maintain good personal hygiene: daily Encourage regular showers, daily Remind 

patient to perform daily oral care, daily Assist patient to perform ADL's


Maintain personal safety: every shift Educate patient to report safety concerns 

to staff, every shift Monitor environment for contraband/sharps


Medication safety: Monitor for expected outcome, potential side effects: every 

shift, Assess barriers to learning: daily, Assess readiness for medication 

education: every shift





Family Contact


Family involvement: Family/SO is involved


Family contact name: Guerline Vásquez -[phone]





Discharge/Continuing Care





- Education Needs


Education Needs: Family Medication, Family Diagnosis/Disease Process, Family 

Coping Skills, Family Anger Management skills, Family Community resources, 

Family Activities of Daily Living, Family Pain, Family Health Practices/Safety, 

Family Personal Hygiene/Grooming, Family Aftercare Safety Plan, Patient 

Medication, Patient Diagnosis/Disease Process, Patient Coping Skills, Patient 

Anger Management skills, Patient Community resources, Patient Activities of 

Daily Living, Patient Pain, Patient Health Practices/Safety, Patient Personal 

Hygiene/Grooming, Patient Aftercare Safety Plan





<MickeySarahy - Last Filed: 08/28/17 10:19>





Treatment assets and liabiliti


Patient Assests: adapts well, resourceful, ADL independent, physically healthy, 

good past tx response


Patient Liabilities: live alone, poor support system





Family Contact


Family contact: Patient declines to allow family contact at present


Family contact name: Guerline Vásquez -[phone]


Family contacted how many times per week?: 1


Family contact comment: Patient has signed a 48 hour notice and is demanding 

discharge. Patient currently not agreeable to signing consent for family 

despite encouragement from staff. Writer to continue to encourage family 

involvement for safe discharge planning.





- Outside Agency


  ** Agency 1


Care involvment: Following patient during stay, Other


Agency contact name: [facility]


Agency contact number: [phone]





- Goals for Treatment


Patient goals for treatment: Patient to continue stabilization on 3NP through 

medication managment and group/supportive therapy. Patient remains internally 

preoccupied, labile, intrusive and disorganized. Patient to be encouraged to 

attend 2-5 groups/weekly to develop effective coping skills, improve insight 

and promote compliance and reality testing. Patient has a 48 hour notice and 

will be screened by [facility] for involuntary tx.





Discharge/Continuing Care





- Education Needs


Education Needs: Family Medication, Family Diagnosis/Disease Process, Family 

Coping Skills, Family Aftercare Safety Plan, Patient Medication, Patient 

Diagnosis/Disease Process, Patient Coping Skills, Patient Community resources, 

Patient Aftercare Safety Plan





- Discharge


Discharge Criteria: Tolerates medication w/o severe side effects, Free of 

Suicidal thoughts, Free of paranoid thoughts, Normal sleep pattern, Ability to 

care for self, Reduction of target symptoms, Other


Discharge to:: Home





- Treatment Team Participation


Discussed with Family/SO: No


Was Patient/Family/SO present at Treatment Team Meeting: No

## 2017-08-24 NOTE — ED PDOC
HPI: Psych/Substance Abuse


Time Seen by Provider: 17 12:14


Chief Complaint (Nursing): Psychiatric Evaluation


Chief Complaint (Provider): Medication Refill


History Per: Patient


History/Exam Limitations: no limitations


Additional Complaint(s): 


Karen Lowry, a 21 year old female, with a past medical history of 

depression and bipolar disorder was sent into the ED from [facility] 

stating that she wants a refill of the 3 medications she takes for her bipolar 

disorder. Patient states that she has not taken her medication in a week and 

can feel herself decompensating. Denies HI/SI ideations. Patient has no other 

medical complaints and states that she is currently just hungry and thirsty. 








Past Medical History


Reviewed: Historical Data, Nursing Documentation, Vital Signs


Vital Signs: 





 Last Vital Signs











Temp  98.5 F   17 11:58


 


Pulse  72   17 11:58


 


Resp  16   17 11:58


 


BP  100/58 L  17 11:58


 


Pulse Ox  100   17 11:58














- Medical History


PMH: Bipolar Disorder, Depression, Schizophrenia





- Family History


Family History: States: Unknown Family Hx





- Immunization History


Hx Tetanus Toxoid Vaccination: No


Hx Influenza Vaccination: No


Hx Pneumococcal Vaccination: No





- Home Medications


Home Medications: 


 Ambulatory Orders











 Medication  Instructions  Recorded


 


No Known Home Med  17














- Allergies


Allergies/Adverse Reactions: 


 Allergies











Allergy/AdvReac Type Severity Reaction Status Date / Time


 


No Known Allergies Allergy   Verified 17 12:49














Review of Systems


Constitutional: Negative for: Fever


Cardiovascular: Negative for: Chest Pain


Respiratory: Negative for: Shortness of Breath


Gastrointestinal: Negative for: Abdominal Pain





Physical Exam





- Reviewed


Nursing Documentation Reviewed: Yes


Vital Signs Reviewed: Yes





- Physical Exam


Appears: Positive for: Non-toxic, No Acute Distress


Head Exam: Positive for: ATRAUMATIC, NORMAL INSPECTION, NORMOCEPHALIC


Skin: Positive for: Normal Color, Warm, Dry


Eye Exam: Positive for: Normal appearance, EOMI, PERRL


ENT: Positive for: Normal ENT Inspection.  Negative for: Nasal Congestion, 

Pharyngeal Erythema


Neck: Positive for: Normal, Painless ROM, Supple


Cardiovascular/Chest: Positive for: Regular Rate, Rhythm, Chest Non Tender.  

Negative for: Tachycardia


Respiratory: Positive for: Normal Breath Sounds.  Negative for: Rhonchi, 

Wheezing, Respiratory Distress


Gastrointestinal/Abdominal: Positive for: Normal Exam, Bowel Sounds, Soft.  

Negative for: Tenderness


Back: Positive for: Normal Inspection.  Negative for: L CVA Tenderness, R CVA 

Tenderness


Extremity: Positive for: Normal ROM.  Negative for: Tenderness, Pedal Edema, 

Deformity, Swelling


Neurologic/Psych: Positive for: Alert, Oriented, Gait





- Laboratory Results


Result Diagrams: 


 17 15:10





 17 15:10





- ECG


O2 Sat by Pulse Oximetry: 100 (RA)


Pulse Ox Interpretation: Normal





Medical Decision Making


Medical Decision Makin Initial Impression: 21 year old female presenting for medication refill





Initial Plan:


*  crisis evaluation








Crisis evaluated patient. Patient will be admitted. Labs ordered





Labs normal. 








____________________________________________________________________________


Scribe Attestation


Documented by Cassidy Campoverde acting as a scribe for Aruna Ortez PA-C.


 


MD Scribe Attestation


All medical record entries made by the Scribe were at my direction and 

personally dictated by me. I have reviewed the chart and agree that the record 

accurately reflects my personal performance of the history, physical exam, 

medical decision making, and the department course for this patient. I have 

also personally directed, reviewed, and agree with the discharge instructions 

and disposition.





Disposition





- Clinical Impression


Clinical Impression: 


 Schizoaffective disorder, bipolar type








- Patient ED Disposition


Is Patient to be Admitted: Yes





- Disposition


Disposition Time: 16:50


Condition: STABLE





- Pt Status Changed To:


Hospital Disposition Of: Inpatient





- Admit Certification


Admit to Inpatient:: After my assessment, the patient will require 

hospitalization for at least two midnights.  This is because of the severity of 

symptoms shown, intensity of services needed, and/or the medical risk in this 

patient being treated as an outpatient.

## 2017-08-25 RX ADMIN — DIVALPROEX SODIUM SCH MG: 500 TABLET, EXTENDED RELEASE ORAL at 21:09
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
## 2017-08-25 NOTE — PCM.PSYCH
Initial Psychiatric Evaluation





- Initial Psychiatric Evaluation


Type of Admission: Voluntary


Legal Status: Capacity


Chief Complaint (in patient's own words): 





can i go today?


Patient's Reaction to Hospitalization: 





pt asking to leave


History of Present Illness and Precipitating Events: 





22 yo female with history of bipolar disorder. pt sent to ER from [facility]. apparently on haldol, seroquel, cogentin and depakote. pt apparently was 

away from her program and not adherent with medications. she return to [facility] and was acting aggressively. she was bizarre and appeared to be 

internally preoccupied. today she is refusing to engage in conversation. 





per crisis report:


Patient is a 20 y/o , single, no children, resides with her mother. Pt 

was referred by [facility] due to manic behavior. Pt. was disreptuve, 

demanding money and food. Pt. was touching other patients, [employer] 

preocuppied speaking negatively about the [employer], it was 

internal preocuppied. Pt. is not taking her medication and could not say the 

name of the medications. Crisis Worker contacted patient's mother who stated 

that patient was living at her brother's apartment in [place], and returned to 

[place] as patient was not following up with her treatment. As per Ms. Vásquez 

(patient's mother) Patient started to have aggressive reactions, as she is 

speaking to her, gets verbally abusive, and not following up with her 

treatment. Ms. Vásquez believes that patient should be admitted for 

stabilization. Patient reported at time of assessment that she was at [facility] and was referred to the hospital for medication. Pt. denied si/hi

,  hallucinations or delusions. Pt. is alerted and oriented x3, Pt. is guarded 

and tense during the assessment , she agreed to sign in for voluntary admission

, stating that she would sign in if the doctor recommends admission. Pt. 

admitted having suicidal Ideations in the past but denied any attempts of 

hurting herself. As per patient's mother Patient had a suicidal ideation in 

2013 with plan to overdose. 


Current Medications: 





Active Medications











Generic Name Dose Route Start Last Admin





  Trade Name Freq  PRN Reason Stop Dose Admin


 


Acetaminophen  650 mg  08/24/17 20:06  





  Tylenol 325mg Tab  PO   





  Q4 PRN   





  Pain, moderate (4-7)   


 


Al Hydrox/Mg Hydrox/Simethicone  30 ml  08/24/17 20:06  





  Maalox Plus 30 Ml  PO   





  Q4 PRN   





  Dyspepsia   


 


Diphenhydramine HCl  50 mg  08/24/17 20:06  





  Benadryl  IM   





  Q6 PRN   





  Extrapyramidal S/S Unable PO   


 


Diphenhydramine HCl  50 mg  08/24/17 21:16  08/25/17 08:16





  Benadryl  PO   50 mg





  HS PRN   Administration





  Insomnia   


 


Haloperidol  5 mg  08/24/17 20:06  08/25/17 08:16





  Haldol  PO   5 mg





  Q4 PRN   Administration





  Agitation   


 


Haloperidol Lactate  5 mg  08/24/17 20:06  





  Haldol  IM   





  Q4 PRN   





  Agitation, Unable to Take PO   


 


Lorazepam  2 mg  08/24/17 20:06  





  Ativan  IM   





  Q4 PRN   





  Anxiety/Agitation,Unable PO   


 


Lorazepam  1 mg  08/24/17 21:16  08/24/17 21:26





  Ativan  PO   1 mg





  Q4 PRN   Administration





  Anxiety/Agitation   


 


Magnesium Hydroxide  30 ml  08/24/17 20:06  





  Milk Of Magnesia  PO   





  HS PRN   





  Constipation   














Past Psychiatric History





- Past Psychiatric History


Previous Treatment History: Inpatient


Prior Professional Help: [facility]


History of Abuse: 





does not want to give answers


History of ETOH/Drug Use: 





uds is negative


History of Family Illness: 





does not give answers


Pertinent Medical Hx (Current Medical&Sleep Prob, Allergies): 





 Allergies











Allergy/AdvReac Type Severity Reaction Status Date / Time


 


No Known Allergies Allergy   Verified 08/20/17 12:49








 





Omeprazole [Omeprazole] 20 mg PO DAILY 08/24/17 











Review of Systems





- Psychiatric


Psychiatric: As Per HPI





Mental Status Examination





- Personal Presentation


Personal Presentation: Looks stated age





- Affect


Affect: Blunted





- Motor Activity


Motor Activity: Calm





- Reliability in Providing Information


Reliability in Providing Information: Poor, due to alteration in thoughts, Poor

, due to altered mood





- Speech


Speech: Other (only gives brief answers)





- Mood


Mood: Depressed, Anxious





- Formal Thought Process


Formal Thought Process: No Impairment





- Obsessions/Compulsions


Obsessions: No


Compulsions: No





- Cognitive Functions


Orientation: Person, Place, Situation


Sensorium: Alert


Attention/Concentration: Easily distracted


Abstract Thinking: [place]


Estimate of Intelligence: Average


Judgement: Intact, as evidence by: Insight regarding need for hospitalization


Additional comments: 





not cooperative





- Risk


Risk: Diminished functioning





- Strength & Assets Inventory


Strength & Assets Inventory: Intelligence





DSM 5 DX





- DSM 5


DSM 5 Diagnosis: 





bipolar disorder


r/o schizoaffective disorder








- Recommended/Plan of Treatment


Treatment Recommendations and Plan of Treatment: 





admit to [facility] for safety and observation


gather collateral information


pt agrees to restart prior medications


disposition planning


hospitalist consult


Projected ELOS: 3-5 days


Prognosis: fair

## 2017-08-26 RX ADMIN — DIVALPROEX SODIUM SCH MG: 500 TABLET, EXTENDED RELEASE ORAL at 21:25

## 2017-08-26 NOTE — PCM.PYCHPN
Psychiatric Progress Note





- Psychiatric Progress Note


Patient seen today, length of contact: chart review case discussed with team


Patient Chief Complaint: 





was at program they told me I need to have my medications stabilized 


Medical Problems: 


per chart


Diagnostic Results: 





per psychiatry


per medicine


per nursing


per social work





DSM 5 Symptoms Update: 





vacillations in mood


lability in mood


Medication Change: No


Medical Record Reviewed: Yes


Consults ordered or reviewed: 





pt seen by hospitalist





Mental Status Examination





- Cognitive Function


Orientation: Person, Place, Situation


Attention: Poor


Concentration: Poor


Association: Loose


Fund of Knowledge: WNL


Decription of patient's judgement and insights: 





impaired





- Mood


Mood: Depressed, Anxious





- Affect


Affect: Blunted





- Speech


Speech: Pressured





- Formal Thought Process


Formal Thought Process: No Impairment





- Homicidal Ideation


Homicidal Ideation: No





Goal/Treatment Plan





- Goal/Treatment Plan


Need for Continued Stay: Remain at risks for inpatient hospitalization, Failed 

transitioning


Progress Toward Problem(s) and Goals/Treatment Plan: 





inpt milieu


pt submitted a 48 hr notice-was explained to patient in both english and 

[place]izen [place] the reason , purpose-up until 48 hours to make decision (within and by 

end of 48 hours) including possible calling of [facility] [place] for possible 

involuntary admission pt verbalized understanding and submitted signed form and 

was witnessed by this writer


Estimated Date of D/C: 08/31/17





- Smoking Cessation


Smoking Cessation Initiated: No


Reason for not providing: deferred

## 2017-08-27 RX ADMIN — DIVALPROEX SODIUM SCH MG: 500 TABLET, EXTENDED RELEASE ORAL at 21:13

## 2017-08-27 RX ADMIN — DIVALPROEX SODIUM SCH MG: 125 TABLET, DELAYED RELEASE ORAL at 16:59

## 2017-08-28 VITALS
DIASTOLIC BLOOD PRESSURE: 73 MMHG | TEMPERATURE: 98.2 F | HEART RATE: 77 BPM | RESPIRATION RATE: 16 BRPM | SYSTOLIC BLOOD PRESSURE: 117 MMHG

## 2017-08-28 RX ADMIN — DIVALPROEX SODIUM SCH MG: 500 TABLET, EXTENDED RELEASE ORAL at 21:31

## 2017-08-28 RX ADMIN — DIVALPROEX SODIUM SCH MG: 125 TABLET, DELAYED RELEASE ORAL at 08:17

## 2017-08-28 NOTE — PCM.PYCHPN
Psychiatric Progress Note





- Psychiatric Progress Note


Patient seen today, length of contact: discussed with team


Patient Chief Complaint: 





i will go today!


Problems Identified/Issues Discussed: 





pt demanding to leave the hospital. she is running around in circles in the 

milieu. exposing her breasts to other pts. she continues to touch this writer 

when he tries to talk to the pt to explain the screening process. pt states "i 

will go when i want to go" she takes medications as prescribed. 


Medication Change: Yes (increase seroquel, check depakote level)


Medical Record Reviewed: Yes





Mental Status Examination





- Cognitive Function


Orientation: Person, Place, Situation


Attention: Poor


Concentration: Poor


Association: Loose


Fund of Knowledge: WNL


Decription of patient's judgement and insights: 





poor insight and judgment





- Mood


Mood: Depressed, Anxious





- Affect


Affect: Blunted





- Speech


Speech: Pressured





- Formal Thought Process


Formal Thought Process: Loosening of associations, Flight of ideas


Psychotic Thoughts and Behaviors: 





grandiose, sexually preoccupied/provocative, disorganized thoughts





- Suicidal Ideation


Suicidal Ideation: No





- Homicidal Ideation


Homicidal Ideation: No





Goal/Treatment Plan





- Goal/Treatment Plan


Need for Continued Stay: Remain at risks for inpatient hospitalization, Failed 

transitioning


Progress Toward Problem(s) and Goals/Treatment Plan: 





bipolar disorder, manic





continue current treatment


will check depakote and increase dose


increase the seroquel


disposition planning- 48 hr notice expires today, will screen for involuntary 

hospitalization as pt is a danger to self secondary to her manic symptoms. 


Estimated Date of D/C: 08/31/17

## 2017-08-28 NOTE — CP.PCM.PN
Subjective





- Date & Time of Evaluation


Date of Evaluation: 08/28/17


Time of Evaluation: 22:06





- Subjective


Subjective: 





Chest X Ray 28/08/17


No Infiltrate nor sign of active disease





Felice Franco MD





Objective





- Vital Signs/Intake and Output


Vital Signs (last 24 hours): 


 











Temp Pulse Resp BP Pulse Ox


 


 98.2 F   77   16   117/73   100 


 


 08/28/17 17:00  08/28/17 17:00  08/28/17 17:00  08/28/17 17:00  08/24/17 17:38











- Medications


Medications: 


 Current Medications





Acetaminophen (Tylenol 325mg Tab)  650 mg PO Q4 PRN


   PRN Reason: Pain, moderate (4-7)


Al Hydrox/Mg Hydrox/Simethicone (Maalox Plus 30 Ml)  30 ml PO Q4 PRN


   PRN Reason: Dyspepsia


Diphenhydramine HCl (Benadryl)  50 mg IM Q6 PRN


   PRN Reason: Extrapyramidal S/S Unable PO


Diphenhydramine HCl (Benadryl)  50 mg PO HS PRN


   PRN Reason: Insomnia


   Last Admin: 08/28/17 14:22 Dose:  50 mg


Diphenhydramine HCl (Benadryl)  50 mg PO Q6 PRN


   PRN Reason: Extrapyramidal Symptoms


   Last Admin: 08/27/17 06:07 Dose:  50 mg


Divalproex Sodium (Depakote Er(Once Daily))  500 mg PO HS [facility]


   Last Admin: 08/28/17 21:31 Dose:  500 mg


Divalproex Sodium (Depakote Dr(*Bid*))  125 mg PO DAILY [facility]


   Last Admin: 08/28/17 08:17 Dose:  125 mg


Haloperidol (Haldol)  5 mg PO Q4 PRN


   PRN Reason: Agitation


   Last Admin: 08/28/17 14:23 Dose:  5 mg


Haloperidol Lactate (Haldol)  5 mg IM Q4 PRN


   PRN Reason: Agitation, Unable to Take PO


Lorazepam (Ativan)  2 mg IM Q4 PRN


   PRN Reason: Anxiety/Agitation,Unable PO


Lorazepam (Ativan)  1 mg PO Q4 PRN


   PRN Reason: Anxiety/Agitation


   Last Admin: 08/28/17 14:22 Dose:  1 mg


Magnesium Hydroxide (Milk Of Magnesia)  30 ml PO HS PRN


   PRN Reason: Constipation


Quetiapine Fumarate (Seroquel)  50 mg PO DAILY [facility]


Quetiapine Fumarate (Seroquel)  150 mg PO [facility]


   Last Admin: 08/28/17 21:31 Dose:  150 mg

## 2017-08-29 NOTE — RAD
HISTORY:

routine requirement for [facility] transfer  



COMPARISON:

None available. 



FINDINGS:



LUNGS:

No infiltrate is appreciated with bronchovascular markings appearing 

unremarkable grossly.



PLEURA:

No significant pleural effusion identified, no pneumothorax apparent.



CARDIOVASCULAR:

Normal.



OSSEOUS STRUCTURES:

No significant abnormalities.



VISUALIZED UPPER ABDOMEN:

Normal.



OTHER FINDINGS:

None.



IMPRESSION:

No definite acute cardiopulmonary disease appreciated this time.

## 2017-09-16 ENCOUNTER — HOSPITAL ENCOUNTER (INPATIENT)
Dept: HOSPITAL 31 - C.ER | Age: 22
LOS: 11 days | Discharge: HOME | DRG: 430 | End: 2017-09-27
Attending: PSYCHIATRY & NEUROLOGY | Admitting: PSYCHIATRY & NEUROLOGY
Payer: MEDICAID

## 2017-09-16 DIAGNOSIS — G47.00: ICD-10-CM

## 2017-09-16 DIAGNOSIS — F25.0: Primary | ICD-10-CM

## 2017-09-16 DIAGNOSIS — F32.9: ICD-10-CM

## 2017-09-16 DIAGNOSIS — F41.9: ICD-10-CM

## 2017-09-16 DIAGNOSIS — R45.851: ICD-10-CM

## 2017-09-16 LAB
ALBUMIN/GLOB SERPL: 1.2 {RATIO} (ref 1–2.1)
ALP SERPL-CCNC: 49 U/L (ref 38–126)
ALT SERPL-CCNC: 22 U/L (ref 9–52)
AST SERPL-CCNC: 20 U/L (ref 14–36)
BASOPHILS # BLD AUTO: 0 K/UL (ref 0–0.2)
BASOPHILS NFR BLD: 0.4 % (ref 0–2)
BILIRUB SERPL-MCNC: 0.4 MG/DL (ref 0.2–1.3)
BILIRUB UR-MCNC: NEGATIVE MG/DL
BUN SERPL-MCNC: 10 MG/DL (ref 7–17)
CALCIUM SERPL-MCNC: 9.3 MG/DL (ref 8.6–10.4)
CHLORIDE SERPL-SCNC: 102 MMOL/L (ref 98–107)
CO2 SERPL-SCNC: 23 MMOL/L (ref 22–30)
EOSINOPHIL # BLD AUTO: 0 K/UL (ref 0–0.7)
EOSINOPHIL NFR BLD: 0.5 % (ref 0–4)
ERYTHROCYTE [DISTWIDTH] IN BLOOD BY AUTOMATED COUNT: 11.6 % (ref 11.5–14.5)
ETHANOL SERPL-MCNC: < 10 MG/DL (ref 0–10)
GLOBULIN SER-MCNC: 3.2 GM/DL (ref 2.2–3.9)
GLUCOSE SERPL-MCNC: 75 MG/DL (ref 65–105)
GLUCOSE UR STRIP-MCNC: NORMAL MG/DL
HCT VFR BLD CALC: 37.7 % (ref 34–47)
KETONES UR STRIP-MCNC: NEGATIVE MG/DL
LEUKOCYTE ESTERASE UR-ACNC: (no result) LEU/UL
LYMPHOCYTES # BLD AUTO: 2 K/UL (ref 1–4.3)
LYMPHOCYTES NFR BLD AUTO: 30.8 % (ref 20–40)
MCH RBC QN AUTO: 31.1 PG (ref 27–31)
MCHC RBC AUTO-ENTMCNC: 34.4 G/DL (ref 33–37)
MCV RBC AUTO: 90.3 FL (ref 81–99)
MONOCYTES # BLD: 0.9 K/UL (ref 0–0.8)
MONOCYTES NFR BLD: 14.1 % (ref 0–10)
NRBC BLD AUTO-RTO: 0 % (ref 0–2)
PH UR STRIP: 7 [PH] (ref 5–8)
PLATELET # BLD: 279 K/UL (ref 130–400)
PMV BLD AUTO: 8.1 FL (ref 7.2–11.7)
POTASSIUM SERPL-SCNC: 4.2 MMOL/L (ref 3.6–5.2)
PROT SERPL-MCNC: 7 G/DL (ref 6.3–8.3)
PROT UR STRIP-MCNC: NEGATIVE MG/DL
RBC # UR STRIP: NEGATIVE /UL
RBC #/AREA URNS HPF: < 1 /HPF (ref 0–3)
SODIUM SERPL-SCNC: 138 MMOL/L (ref 132–148)
SP GR UR STRIP: 1.01 (ref 1–1.03)
UROBILINOGEN UR-MCNC: NORMAL MG/DL (ref 0.2–1)
WBC # BLD AUTO: 6.6 K/UL (ref 4.8–10.8)
WBC #/AREA URNS HPF: < 1 /HPF (ref 0–5)

## 2017-09-16 NOTE — C.PDOC
History Of Present Illness


A 21 year old female, whose past medical history includes bipolar disorder, 

anxiety, depression, and schizophrenia, presents to the emergency department 

for suicidal ideation, which began 1 week ago. The patient reports she wanted 

to stab herself, but does not feel that way right now. The patient admits to 

being in and out of hospitals across the [place] due to her bipolar disorder. 

The patient states she is scared and does not want to leave the hospital, 

because she says the demons are talking to her and just incase she decides to 

hurt herself, she would rather be in the hospital. The patient denies any fever

, chills, chest pain, shortness of breath, nausea, vomiting, diarrhea, or any 

other complaints at this time.








Time Seen by Provider: 09/16/17 11:17


Chief Complaint (Nursing): Psychiatric Evaluation


History Per: Patient


History/Exam Limitations: no limitations


Onset/Duration Of Symptoms: Days (1 week)


Current Symptoms Are (Timing): Still Present


Associated Symptoms: Depression, Suicidal Thoughts





Past Medical History


Reviewed: Historical Data, Nursing Documentation, Vital Signs


Vital Signs: 


 Last Vital Signs











Temp  99.1 F   09/16/17 11:02


 


Pulse  70   09/16/17 11:02


 


Resp  20   09/16/17 11:02


 


BP  113/73   09/16/17 11:02


 


Pulse Ox  99   09/16/17 11:59














- Medical History


PMH: Anxiety, Bipolar Disorder, Depression, Schizophrenia


   Denies: Diabetes, Hepatitis, HIV, HTN, Chronic Kidney Disease, Seizures, 

Sexually Transmitted Disease





- [facility] Procedures








FAMILY PSYCHOTHERAPY (03/10/17)


GROUP PSYCHOTHERAPY (08/24/17)


INDIVIDUAL PSYCHOTHERAPY, SUPPORTIVE (03/10/17)


MEDICATION MANAGEMENT (03/10/17)


PSYCHIA INTERV/EVAL NEC (06/16/15)








Family History: States: Unknown Family Hx





- Social History


Hx Tobacco Use: No


Hx Alcohol Use: No


Hx Substance Use: No





- Immunization History


Hx Tetanus Toxoid Vaccination: No


Hx Influenza Vaccination: No


Hx Pneumococcal Vaccination: No





Review Of Systems


Except As Marked, All Systems Reviewed And Found Negative.


Constitutional: Negative for: Fever, Chills


Cardiovascular: Negative for: Chest Pain


Respiratory: Negative for: Shortness of Breath


Gastrointestinal: Negative for: Nausea, Vomiting, Diarrhea





Physical Exam





- Physical Exam


Appears: Well, No Acute Distress, Other (quiet and withdrawn )


Skin: Normal Color, Warm, Dry


Eye(s): bilateral: Normal Inspection, PERRL, EOMI


Nose: Normal


Throat: Normal


Neck: Normal


Cardiovascular: Rhythm Regular


Respiratory: Normal Breath Sounds


Gastrointestinal/Abdominal: Normal Exam


Back: Normal Inspection


Extremity: Normal ROM


Neurological/Psych: Oriented x3, Normal Speech, Normal Cognition, Normal 

Cranial Nerves, Other (quiet and withdrawn )





ED Course And Treatment





- Laboratory Results


Result Diagrams: 


 09/16/17 11:56





 09/16/17 11:56


O2 Sat by Pulse Oximetry: 99





Medical Decision Making


Medical Decision Making: 


Treatment Plan:


-- Labs


-- 1:1 Observation


-- [facility] Crisis Evaluation


-- Urinalysis 





Progress Notes:











Disposition


Counseled Patient/Family Regarding: Studies Performed, Diagnosis





- Disposition


Disposition: HOSPITALIZED


Disposition Time: 16:50


Condition: GUARDED





- POA


Present On Arrival: None





- Clinical Impression


Clinical Impression: 


 Bipolar 1 disorder, Suicidal ideation








- Scribe Statement


The provider has reviewed the documentation as recorded by the Scribe


Anita Baldwin 





All medical record entries made by the Scribe were at my direction and 

personally dictated by me. I have reviewed the chart and agree that the record 

accurately reflects my personal performance of the history, physical exam, 

medical decision making, and the department course for this patient. I have 

also personally directed, reviewed, and agree with the discharge instructions 

and disposition.





Decision To Admit





- Pt Status Changed To:


Hospital Disposition Of: Inpatient





- Admit Certification


Admit to Inpatient:: After my assessment, the patient will require 

hospitalization for at least two midnights.  This is because of the severity of 

symptoms shown, intensity of services needed, and/or the medical risk in this 

patient being treated as an outpatient.





- InPatient:


Physician Admission Certification: I certify that this patient requires 2 or 

more midnights of care for the following reason:: needs inpatient management





- .


Bed Request Type: Psychiatry


Admitting Physician: Yuridia Lipscomb


Patient Diagnosis: 


 Bipolar 1 disorder, Suicidal ideation

## 2017-09-17 PROCEDURE — GZ58ZZZ INDIVIDUAL PSYCHOTHERAPY, COGNITIVE-BEHAVIORAL: ICD-10-PCS | Performed by: PSYCHIATRY & NEUROLOGY

## 2017-09-17 PROCEDURE — GZHZZZZ GROUP PSYCHOTHERAPY: ICD-10-PCS | Performed by: PSYCHIATRY & NEUROLOGY

## 2017-09-17 PROCEDURE — GZ56ZZZ INDIVIDUAL PSYCHOTHERAPY, SUPPORTIVE: ICD-10-PCS | Performed by: PSYCHIATRY & NEUROLOGY

## 2017-09-17 RX ADMIN — PANTOPRAZOLE SODIUM SCH: 40 TABLET, DELAYED RELEASE ORAL at 13:35

## 2017-09-17 RX ADMIN — PANTOPRAZOLE SODIUM SCH MG: 40 TABLET, DELAYED RELEASE ORAL at 17:20

## 2017-09-17 NOTE — PCM.PSYCH
Initial Psychiatric Evaluation





- Initial Psychiatric Evaluation


Type of Admission: Voluntary


Legal Status: Capacity


Chief Complaint (in patient's own words): 





I don't know.'


History of Present Illness and Precipitating Events: 





The pt is a 21 yr old  female, Upper sor[place] speaking, who presents to [facility] 

due to SI and auditory hallucination. 





As per the ED note, patient was very disorganized and internally preoccupied 

and was responding to internal stimuli. The pt was not cooperative and very 

vague to answering questions. The pt reported that her blood is infected. The 

pt reported suicidal ideation without any plan. She also reported auditory 

hallucinations, hearing demons, but did not want to discuss or elaborate 

further regarding what the demons were stating. 





The pt was recently at [facility] approximately 2 weeks ago as the pt took a knife to 

harm herself, but did not follow through. The pt is linked to [facility], 

but the pt denies attending and is non-complaint with medication. 





Patient remained guarded, evasive, and paranoid in the unit. She still 

reporting auditory and visual hallucinations and remained delusional. She also 

appears very confused and cannot recall the names of her medications.





Past medical history


None reported





Current Medications: 





Active Medications











Generic Name Dose Route Start Last Admin





  Trade Name Freq  PRN Reason Stop Dose Admin


 


Benztropine Mesylate  2 mg  09/16/17 20:31  09/17/17 11:19





  Cogentin  PO   2 mg





  Q6 PRN   Administration





  EPS   


 


Haloperidol  5 mg  09/16/17 20:31  09/17/17 11:19





  Haldol  PO   5 mg





  Q6 PRN   Administration





  Agitation   


 


Ibuprofen  600 mg  09/16/17 19:07  





  Motrin Tab  PO   





  Q6 PRN   





  Pain, moderate (4-7)   


 


Lorazepam  1 mg  09/16/17 19:07  





  Ativan  PO   





  Q6 PRN   





  Anxiety   


 


Quetiapine Fumarate  50 mg  09/16/17 22:00  





  Seroquel  PO   





  HS PASQUALE   


 


Trazodone HCl  50 mg  09/16/17 19:07  





  Desyrel  PO   





  HS PRN   





  Insomnia   














Past Psychiatric History





- Past Psychiatric History


Previous Treatment History: Inpatient


Pertinent Medical Hx (Current Medical&Sleep Prob, Allergies): 





 Allergies











Allergy/AdvReac Type Severity Reaction Status Date / Time


 


No Known Allergies Allergy   Verified 09/16/17 11:08








 





Omeprazole [Omeprazole] 20 mg PO DAILY 08/24/17 


Divalproex Sodium [Divalproex Sodium ER] 500 mg PO BID 09/16/17 


Zolpidem Tartrate 5 mg PO HS 09/16/17 


risperiDONE [RisperDAL] 2 mg PO BID 09/16/17 











Review of Systems





- Review of Systems


All systems: reviewed and no additional remarkable complaints except





- Psychiatric


Psychiatric: Anxiety, Auditory Hallucinations, Irritability, Mood Swings, 

Paranoia, Suicidal Ideation, Visual Hallucinations





Mental Status Examination





- Personal Presentation


Personal Presentation: Looks stated age





- Affect


Affect: Constricted





- Motor Activity


Motor Activity: Psychomotor Retardation





- Reliability in Providing Information


Reliability in Providing Information: Poor, due to alteration in thoughts, Poor

, due to altered mood





- Speech


Speech: Disorganized





- Mood


Mood: Depressed, Anxious





- Formal Thought Process


Formal Thought Process: Hallucinations, Delusions, Paranoia, Loosening of 

associations, Flight of ideas





- Hallucinations/Delusions


Hallucinations: Visual, Auditory


Delusions: Persecution





- Obsessions/Compulsions


Obsessions: No


Compulsions: No





- Cognitive Functions


Orientation: Person, Place, Situation, Time


Sensorium: Alert


Attention/Concentration: Attentive


Abstract Thinking: [place]


Estimate of Intelligence: Below average


Judgement: Imparied, as evidence by: Poor judgement, Imparied, as evidence by: 

Lack of insight into illness





- Risk


Risk: Suicidal, Diminished functioning





- Strength & Assets Inventory


Strength & Assets Inventory: Family support





DSM 5 DX





- DSM 5


DSM 5 Diagnosis: 





Schizoaffective disorder bipolar type





- Recommended/Plan of Treatment


Treatment Recommendations and Plan of Treatment: 








Schizoaffective disorder bipolar type


CBT   


Psychoeducation


Supportive therapy, group therapy, individual therapy


Risperdal 1 mg by mouth twice a day


Depakote 250 mg by mouth twice a day 


Zoloft 50 mg daily


Seroquel 50 mg


Trazodone 50 mg by mouth daily at bedtime 





- Smoking Cessation


Smoking Cessation Initiated: No

## 2017-09-18 RX ADMIN — PANTOPRAZOLE SODIUM SCH MG: 40 TABLET, DELAYED RELEASE ORAL at 11:08

## 2017-09-18 NOTE — PCM.PYCHPN
Psychiatric Progress Note





- Psychiatric Progress Note


Patient seen today, length of contact: 15 min


Patient Chief Complaint: 





I am hearing voices.'


Problems Identified/Issues Discussed: 





Patient seen and evaluated, chart reviewed and discussed with the nurse. 





Patient remained disorganized and internally preoccupied. She still reports of 

hearing voices but remained guarded about the details. Patient still appears 

paranoid and delusional. She is constantly pacing back and forth in the 

hallways and talking to herself. 





Patient remained isolated, confined and withdrawn. 





She is taking medication and denies any side effects.





Supportive therapy and psychoeducation were given.


Medication Change: Yes (increase risperdal)


Medical Record Reviewed: Yes





Mental Status Examination





- Cognitive Function


Orientation: Person, Place, Situation, Time


Memory: Intact


Attention: Poor


Concentration: Poor


Association: Loose


Fund of Knowledge: Poor





- Mood


Mood: Depressed, Anxious





- Affect


Affect: Constricted, Depressed





- Speech


Speech: Soft





- Formal Thought Process


Formal Thought Process: Hallucinations, Delusions, Paranoia, Loosening of 

associations, Flight of ideas





- Suicidal Ideation


Suicidal Ideation: No





- Homicidal Ideation


Homicidal Ideation: No





Goal/Treatment Plan





- Goal/Treatment Plan


Need for Continued Stay: Discharge may exacerbated symptoms, Severe functional 

impairment


Progress Toward Problem(s) and Goals/Treatment Plan: 





Schizoaffective disorder bipolar type


CBT   


Psychoeducation


Supportive therapy, group therapy, individual therapy


Risperdal 1 mg by mouth twice a day


Depakote 250 mg by mouth twice a day 


Zoloft 50 mg daily


Seroquel 50 mg


Trazodone 50 mg by mouth daily at bedtime 





- Smoking Cessation


Smoking Cessation Initiated: No

## 2017-09-19 RX ADMIN — PANTOPRAZOLE SODIUM SCH MG: 40 TABLET, DELAYED RELEASE ORAL at 10:32

## 2017-09-20 RX ADMIN — PANTOPRAZOLE SODIUM SCH MG: 40 TABLET, DELAYED RELEASE ORAL at 09:45

## 2017-09-21 RX ADMIN — PANTOPRAZOLE SODIUM SCH MG: 40 TABLET, DELAYED RELEASE ORAL at 09:35

## 2017-09-21 NOTE — PCM.PYCHPN
Psychiatric Progress Note





- Psychiatric Progress Note


Patient seen today, length of contact: 15 min


Patient Chief Complaint: 





I am feeling little better.'


Problems Identified/Issues Discussed: 








Patient seen and evaluated, chart reviewed and discussed with the nurse. 





Patient remained disorganized and internally preoccupied and responding to 

internal stimuli. Patient has wrapped a sheet around her body and is constantly 

pacing back and forth in the hallways and talking to herself. 





She reports some improvement in hearing voices but remained guarded about the 

details. Patient still appears paranoid and delusional. 





Patient remained depressed, isolated, confined and withdrawn. 





She is taking medication and denies any side effects.





Supportive therapy and psychoeducation were given.


Medication Change: Yes (increase zoloft)


Medical Record Reviewed: Yes





Mental Status Examination





- Cognitive Function


Orientation: Person, Place, Situation, Time


Memory: Intact


Attention: Poor


Concentration: Poor


Association: Loose


Fund of Knowledge: Poor





- Mood


Mood: Depressed, Anxious





- Affect


Affect: Constricted, Depressed





- Speech


Speech: Soft





- Formal Thought Process


Formal Thought Process: Hallucinations, Delusions, Paranoia, Loosening of 

associations





- Suicidal Ideation


Suicidal Ideation: No





- Homicidal Ideation


Homicidal Ideation: No





Goal/Treatment Plan





- Goal/Treatment Plan


Need for Continued Stay: Discharge may exacerbated symptoms, Severe functional 

impairment


Progress Toward Problem(s) and Goals/Treatment Plan: 





Schizoaffective disorder bipolar type


CBT   


Psychoeducation


Supportive therapy, group therapy, individual therapy


Risperdal 2 mg by mouth daily


Risperdal 3 mg PO QHS


Depakote 250 mg by mouth twice a day 


Zoloft 50 mg daily


Seroquel 50 mg


Trazodone 50 mg by mouth daily at bedtime 





- Smoking Cessation


Smoking Cessation Initiated: No

## 2017-09-21 NOTE — PCM.PYCHPN
Psychiatric Progress Note





- Psychiatric Progress Note


Patient seen today, length of contact: 15 min


Patient Chief Complaint: 





I am feeling little better.'


Problems Identified/Issues Discussed: 








Patient seen and evaluated, chart reviewed and discussed with the nurse. 





As per the staff, patient is still talking to herself and walking back and 

forth in the hallways. When asked who is she talking to? she said, 'I am 

praying for the people who are  in the [place] hurricane.' She remained 

disorganized and internally preoccupied and responding to internal stimuli. 

Patient has wrapped a sheet around her face and body.  She reports some 

improvement in hearing voices but remained guarded about the details. 





Patient still appears paranoid and delusional. 





Patient remained depressed, isolated, confined and withdrawn. 


She is taking medication and denies any side effects.


Supportive therapy and psychoeducation were given.


Medication Change: Yes (increase zoloft)


Medical Record Reviewed: Yes





Mental Status Examination





- Cognitive Function


Orientation: Person, Place, Situation, Time


Memory: Intact


Attention: WNL


Concentration: Poor


Association: Loose


Fund of Knowledge: Poor





- Mood


Mood: Depressed, Anxious





- Affect


Affect: Constricted, Depressed





- Speech


Speech: Soft





- Formal Thought Process


Formal Thought Process: Hallucinations, Delusions, Paranoia, Loosening of 

associations





- Suicidal Ideation


Suicidal Ideation: No





- Homicidal Ideation


Homicidal Ideation: No





Goal/Treatment Plan





- Goal/Treatment Plan


Need for Continued Stay: Discharge may exacerbated symptoms, Severe functional 

impairment


Progress Toward Problem(s) and Goals/Treatment Plan: 





Schizoaffective disorder bipolar type


CBT   


Psychoeducation


Supportive therapy, group therapy, individual therapy


Risperdal 2 mg by mouth daily


Risperdal 3 mg PO QHS


Depakote 250 mg by mouth twice a day 


Zoloft 100 mg daily


Seroquel 50 mg


Trazodone 50 mg by mouth daily at bedtime 





- Smoking Cessation


Smoking Cessation Initiated: No

## 2017-09-21 NOTE — PCM.PYCHPN
Psychiatric Progress Note





- Psychiatric Progress Note


Patient seen today, length of contact: 15 min


Patient Chief Complaint: 





I am aminta


Problems Identified/Issues Discussed: 








Patient seen and evaluated, chart reviewed and discussed with the nurse. 





Patient is constantly pacing back and forth in the hallways and talking to 

herself and making a one way conversation. Patient remained disorganized and 

internally preoccupied. She still reports of hearing voices but remained 

guarded about the details. 





Patient still appears paranoid and delusional. Patient remained isolated, 

confined and withdrawn. 





She is taking medication and denies any side effects.





Supportive therapy and psychoeducation were given.


Medication Change: Yes (increase risperdal)


Medical Record Reviewed: Yes





Mental Status Examination





- Cognitive Function


Orientation: Person, Place, Situation, Time


Memory: Intact


Attention: Poor


Concentration: Poor


Association: Loose


Fund of Knowledge: Poor





- Mood


Mood: Depressed, Anxious





- Affect


Affect: Constricted, Depressed





- Speech


Speech: Soft





- Formal Thought Process


Formal Thought Process: Hallucinations, Delusions, Paranoia, Loosening of 

associations, Flight of ideas





- Suicidal Ideation


Suicidal Ideation: No





- Homicidal Ideation


Homicidal Ideation: No





Goal/Treatment Plan





- Goal/Treatment Plan


Need for Continued Stay: Discharge may exacerbated symptoms, Severe functional 

impairment


Progress Toward Problem(s) and Goals/Treatment Plan: 





Schizoaffective disorder bipolar type


CBT   


Psychoeducation


Supportive therapy, group therapy, individual therapy


Risperdal 2 mg by mouth twice a day


Depakote 250 mg by mouth twice a day 


Zoloft 50 mg daily


Seroquel 50 mg


Trazodone 50 mg by mouth daily at bedtime 





- Smoking Cessation


Smoking Cessation Initiated: No

## 2017-09-22 RX ADMIN — PANTOPRAZOLE SODIUM SCH MG: 40 TABLET, DELAYED RELEASE ORAL at 09:35

## 2017-09-22 NOTE — PCM.PYCHPN
Psychiatric Progress Note





- Psychiatric Progress Note


Patient seen today, length of contact: 15 min


Patient Chief Complaint: 





I am feeling little better.'


Problems Identified/Issues Discussed: 








Patient seen and evaluated, chart reviewed and discussed with the nurse. 





Pt is still found talking to herself and making a one way conversation. She is 

still walking back and forth in the hallways. She appears somewhat organized 

and less internally preoccupied than before. She is still walking with a sheet 

wrapped around her face and body.  She reports some improvement in hearing 

voices but remained guarded about the details. Patient still appears paranoid 

and delusional. 





Patient remained depressed, isolated, confined and withdrawn. 


She is taking medication and denies any side effects.


Supportive therapy and psychoeducation were given.


Medication Change: Yes (increase zoloft)


Medical Record Reviewed: Yes





Mental Status Examination





- Cognitive Function


Orientation: Person, Place, Situation, Time


Memory: Intact


Attention: WNL


Concentration: Poor


Association: Loose


Fund of Knowledge: Poor





- Mood


Mood: Depressed, Anxious





- Affect


Affect: Constricted, Depressed





- Speech


Speech: Soft





- Formal Thought Process


Formal Thought Process: Hallucinations, Delusions, Paranoia, Loosening of 

associations





- Suicidal Ideation


Suicidal Ideation: No





- Homicidal Ideation


Homicidal Ideation: No





Goal/Treatment Plan





- Goal/Treatment Plan


Need for Continued Stay: Discharge may exacerbated symptoms, Severe functional 

impairment


Progress Toward Problem(s) and Goals/Treatment Plan: 





Schizoaffective disorder bipolar type


CBT   


Psychoeducation


Supportive therapy, group therapy, individual therapy


Risperdal 2 mg by mouth daily


Increase Risperdal 4 mg PO QHS


Increase Depakote to 500 mg by mouth twice a day 


Zoloft 100 mg daily


Seroquel 50 mg


Trazodone 50 mg by mouth daily at bedtime 





- Smoking Cessation


Smoking Cessation Initiated: No

## 2017-09-23 RX ADMIN — PANTOPRAZOLE SODIUM SCH MG: 40 TABLET, DELAYED RELEASE ORAL at 09:34

## 2017-09-23 NOTE — PCM.PYCHPN
Psychiatric Progress Note





- Psychiatric Progress Note


Patient seen today, length of contact: 15 min


Patient Chief Complaint: 








Feeling much better


Problems Identified/Issues Discussed: 





Patient seen. Chart reviewed. Case discussed with staff.


Issues related to illness and treatment were discussed with the patient.


Reported compliant with treatment with no adverse affects. Tolerating treatment 

very well.


Reported she is feeling better and is more social. Also attending groups.


Staff also confirmed the above.





At the time of evaluation, patient was awake alert oriented 3, had no delusions

, no auditory or visual hallucinations, no suicidal ideations or homicidal 

ideations.


Medical Problems: 





None reported


Diagnostic Results: 





Reviewed


DSM 5 Symptoms Update: 





Improving with treatment


Medication Change: No


Medical Record Reviewed: Yes





Mental Status Examination





- Cognitive Function


Orientation: Person, Place, Situation, Time


Memory: Intact


Attention: WNL


Concentration: WNL


Association: WN


Fund of Knowledge: [facility]


Decription of patient's judgement and insights: 





Fair





- Mood


Mood: Depressed (Much less than before)





- Affect


Affect: Depressed





- Speech


Speech: Soft





- Formal Thought Process


Formal Thought Process: No Impairment


Psychotic Thoughts and Behaviors: 





None





- Suicidal Ideation


Suicidal Ideation: No





- Homicidal Ideation


Homicidal Ideation: No





Goal/Treatment Plan





- Goal/Treatment Plan


Need for Continued Stay: Remain at risks for inpatient hospitalization, 

Discharge may exacerbated symptoms, Severe functional impairment


Progress Toward Problem(s) and Goals/Treatment Plan: 





Patient education


Supportive therapy


Continue treatment as before





Estimated Date of D/C: 09/26/17





- Smoking Cessation


Smoking Cessation Initiated: No

## 2017-09-24 RX ADMIN — PANTOPRAZOLE SODIUM SCH MG: 40 TABLET, DELAYED RELEASE ORAL at 09:13

## 2017-09-24 RX ADMIN — DIVALPROEX SODIUM SCH MG: 500 TABLET, EXTENDED RELEASE ORAL at 17:24

## 2017-09-24 NOTE — PCM.PYCHPN
Psychiatric Progress Note





- Psychiatric Progress Note


Patient seen today, length of contact: 15 min


Patient Chief Complaint: 








I am Feeling much better


Problems Identified/Issues Discussed: 








Patient seen. Chart reviewed. Case discussed with staff.


Issues related to illness and treatment were discussed with the patient.


Reported compliant with treatment with no adverse affects. Tolerating treatment 

very well.


Reported she is feeling better and is more social. Also attending groups.


Staff also confirmed the above.





At the time of evaluation, patient was awake alert oriented 3, had no delusions

, no auditory or visual hallucinations, no suicidal ideations or homicidal 

ideations.


Medical Problems: 








None reported


Diagnostic Results: 








Reviewed


DSM 5 Symptoms Update: 





Improving with treatment


Medication Change: No


Medical Record Reviewed: Yes





Mental Status Examination





- Cognitive Function


Orientation: Person, Place, Situation, Time


Memory: Intact


Attention: WNL


Concentration: WNL


Association: [facility]


Fund of Knowledge: [facility]


Decription of patient's judgement and insights: 





Fair





- Mood


Mood: Neutral





- Affect


Affect: Other (Appropriate)





- Speech


Speech: Soft





- Formal Thought Process


Formal Thought Process: No Impairment


Psychotic Thoughts and Behaviors: 





None





- Suicidal Ideation


Suicidal Ideation: No





- Homicidal Ideation


Homicidal Ideation: No





Goal/Treatment Plan





- Goal/Treatment Plan


Need for Continued Stay: Remain at risks for inpatient hospitalization, 

Discharge may exacerbated symptoms, Severe functional impairment


Progress Toward Problem(s) and Goals/Treatment Plan: 








Patient education


Supportive therapy


Continue treatment as before





Estimated Date of D/C: 09/26/17





- Smoking Cessation


Smoking Cessation Initiated: No

## 2017-09-25 RX ADMIN — PANTOPRAZOLE SODIUM SCH MG: 40 TABLET, DELAYED RELEASE ORAL at 09:20

## 2017-09-25 RX ADMIN — DIVALPROEX SODIUM SCH MG: 500 TABLET, EXTENDED RELEASE ORAL at 17:30

## 2017-09-25 RX ADMIN — DIVALPROEX SODIUM SCH MG: 500 TABLET, EXTENDED RELEASE ORAL at 09:20

## 2017-09-25 NOTE — PCM.PYCHPN
Psychiatric Progress Note





- Psychiatric Progress Note


Patient seen today, length of contact: 15 min


Patient Chief Complaint: 


"I'm ready to leave"





Problems Identified/Issues Discussed: 


Pt is seen, chart reviewed, case discussed with staff.





Pt is compliant with medications and reports no side effects.





Pt reports that she is sleeping well. She denies SI and hallucinations. 





Pt reports that she would like to be discharged today but agees to wait until 

Tuesday or Wednesday. She is encouraged to socialize with other patients in the 

meantime. 





Support and psychoeducation given, CBT and MI used briefly.





After care discussed.





Medication Change: No


Medical Record Reviewed: Yes





Mental Status Examination





- Cognitive Function


Orientation: Person, Place, Situation, Time


Memory: Intact


Attention: WNL


Concentration: WNL


Association: WNL


Fund of Knowledge: WNL





- Mood


Mood: Neutral





- Affect


Affect: Blunted





- Speech


Speech: Appropriate





- Formal Thought Process


Formal Thought Process: No Impairment





- Suicidal Ideation


Suicidal Ideation: No





- Homicidal Ideation


Homicidal Ideation: No





Goal/Treatment Plan





- Goal/Treatment Plan


Need for Continued Stay: Remain at risks for inpatient hospitalization, 

Discharge may exacerbated symptoms


Progress Toward Problem(s) and Goals/Treatment Plan: 


Schizoaffective disorder, bipolar type


Support and psychoeducation daily


Attend groups and activities daily


Continue medications as prescribed





Estimated Date of D/C: 09/26/17





- Smoking Cessation


Smoking Cessation Initiated: No

## 2017-09-26 VITALS — RESPIRATION RATE: 20 BRPM | OXYGEN SATURATION: 99 %

## 2017-09-26 RX ADMIN — DIVALPROEX SODIUM SCH MG: 500 TABLET, EXTENDED RELEASE ORAL at 09:27

## 2017-09-26 RX ADMIN — PANTOPRAZOLE SODIUM SCH MG: 40 TABLET, DELAYED RELEASE ORAL at 09:27

## 2017-09-26 RX ADMIN — DIVALPROEX SODIUM SCH MG: 500 TABLET, EXTENDED RELEASE ORAL at 17:56

## 2017-09-26 NOTE — PCM.PYCHPN
Psychiatric Progress Note





- Psychiatric Progress Note


Patient seen today, length of contact: 15 min


Patient Chief Complaint: 





I am Feeling much better


Problems Identified/Issues Discussed: 





Patient seen. Chart reviewed. Case discussed with staff.


Issues related to illness and treatment were discussed with the patient.


Reported compliant with treatment with no adverse affects. Tolerating treatment 

very well.


Reported she is feeling better and is more social. Also attending groups.


Staff also confirmed the above.





At the time of evaluation, patient was awake alert oriented 3, had no delusions

, no auditory or visual hallucinations, no suicidal ideations or homicidal 

ideations.


Medical Problems: 





None reported


Diagnostic Results: 





Reviewed


DSM 5 Symptoms Update: 





Improving with treatment


Medication Change: No


Medical Record Reviewed: Yes





Mental Status Examination





- Cognitive Function


Orientation: Person, Place, Situation, Time


Memory: Intact


Attention: WNL


Concentration: WNL


Association: [facility]


Fund of Knowledge: [facility]


Decription of patient's judgement and insights: 





Fair





- Mood


Mood: Neutral





- Affect


Affect: Other (Appropriate)





- Speech


Speech: Appropriate





- Formal Thought Process


Formal Thought Process: No Impairment


Psychotic Thoughts and Behaviors: 





None





- Suicidal Ideation


Suicidal Ideation: No





- Homicidal Ideation


Homicidal Ideation: No





Goal/Treatment Plan





- Goal/Treatment Plan


Need for Continued Stay: Remain at risks for inpatient hospitalization, 

Discharge may exacerbated symptoms, Severe functional impairment


Progress Toward Problem(s) and Goals/Treatment Plan: 





Patient education


Supportive therapy


Continue treatment as before


We will go to [facility]





Estimated Date of D/C: 09/27/17





- Smoking Cessation


Smoking Cessation Initiated: No

## 2017-09-27 VITALS — HEART RATE: 48 BPM | SYSTOLIC BLOOD PRESSURE: 98 MMHG | DIASTOLIC BLOOD PRESSURE: 55 MMHG | TEMPERATURE: 98.3 F

## 2017-09-27 RX ADMIN — DIVALPROEX SODIUM SCH MG: 500 TABLET, EXTENDED RELEASE ORAL at 09:35

## 2017-09-27 RX ADMIN — PANTOPRAZOLE SODIUM SCH MG: 40 TABLET, DELAYED RELEASE ORAL at 09:35

## 2017-09-27 NOTE — PCM.PYCHDC
Mental Status Examination





- Mental Status Examination


Orientation: Person, Place, Situation, Time


Memory: Intact


Mood: Neutral


Affect: Other (Appropriate)


Speech: Appropriate


Attention: WNL


Concentration: WNL


Association: WNL


Fund of Knowledge: WNL


Formal Thought Process: No Impairment


Description of patient's judgement and insight: 








Fair


Psychotic Thoughts and Behaviors: 








None


Suicidal Ideation: No


Current Homicidal Ideation?: No





Discharge Summary





- Discharge Note


Reason for Hospitalization: 





Bipolar disorder


Laboratory Data: 





Reviewed


Consultations:: List each consultation separately and include:  1. Reason for 

request.  2. Findings.  3. Follow-up


Summary of Hospital Course include:: 1. Description of specific treatment plan 

utilized for patients during their course of treatmen.  2. Summarize the time-

course for resolution of acute symptoms and/or regressed behaviors.  3. 

Describe issues identified and worked on during hospitalization.  4. Describe 

medication utilized.  5. Describe medical problems identified and treated.  6. 

Reassessment of suicide risk


Summary of Hospital Course: 





The pt is a 21 yr old  female, [place] speaking, who presents to [facility] 

due to SI and auditory hallucination. 





As per the ED note, patient was very disorganized and internally preoccupied 

and was responding to internal stimuli. The pt was not cooperative and very 

vague to answering questions. The pt reported that her blood is infected. The 

pt reported suicidal ideation without any plan. She also reported auditory 

hallucinations, hearing demons, but did not want to discuss or elaborate 

further regarding what the demons were stating. 





[place] [facility] [place] the pt took a knife to 

harm herself, but did not follow through. The pt is linked to [facility], 

but the pt denies attending and is non-complaint with medication. 





Patient remained guarded, evasive, and paranoid in the unit. She still 

reporting auditory and visual hallucinations and remained delusional. She also 

appears very confused and cannot recall the names of her medications.





Past medical history


None reported








During her stay patient was started on Depakote, Risperdal, sertraline and 

trazodone. Patient will also started on other when necessary medications. 

Patient had a group therapy and individual therapy. With all above treatment, 

patient started feeling better. Today patient was stable and ready for 

discharge. Patient was discharged in a stable condition.





At the time of evaluation and discharge, patient was awake alert oriented 3, 

had no delusions, no auditory or visual hallucinations, no suicidal ideations 

or homicidal ideations. Patient was discharged in a stable condition.





- Final Diagnosis (DSM 5)


Condition upon Discharge: GUARDED


Disposition: HOME/ ROUTINE


Follow-up Treatment Plan: 








We will go to [facility]





Prescriptions/Medication Reconciliation: 


Divalproex Sodium [Divalproex Sodium ER] 500 mg PO BID #60 tab.er.24h


risperiDONE [RisperDAL Tab] 2 mg PO HS #30 tab


Sertraline [Zoloft] 100 mg PO DAILY #30 tab


traZODone [Desyrel] 100 mg PO HS PRN #30 tab


 PRN Reason: Insomnia





- Smoking Cessation


Smoking Cessation Medication prescribed: No


Reason for not providing: Patient doesn't smoke





- Antipsychotic Medications


Pt discharged on 2 or more routine antipsychotic medications: No

## 2018-02-14 ENCOUNTER — HOSPITAL ENCOUNTER (EMERGENCY)
Dept: HOSPITAL 14 - H.ER | Age: 23
Discharge: HOME | End: 2018-02-14
Payer: MEDICAID

## 2018-02-14 VITALS
OXYGEN SATURATION: 98 % | RESPIRATION RATE: 16 BRPM | TEMPERATURE: 98.1 F | DIASTOLIC BLOOD PRESSURE: 65 MMHG | SYSTOLIC BLOOD PRESSURE: 106 MMHG | HEART RATE: 62 BPM

## 2018-02-14 DIAGNOSIS — F20.9: ICD-10-CM

## 2018-02-14 DIAGNOSIS — Z76.0: Primary | ICD-10-CM

## 2018-02-14 DIAGNOSIS — F41.9: ICD-10-CM

## 2018-02-14 DIAGNOSIS — F31.9: ICD-10-CM

## 2018-02-14 NOTE — ED PDOC
HPI: Psych/Substance Abuse


Time Seen by Provider: 02/14/18 18:07


Chief Complaint (Nursing): Psychiatric Evaluation


Chief Complaint (Provider): Medication Refill


History Per: Patient


History/Exam Limitations: no limitations


Suicide/Self Injury Attempted (Context): None


Additional Complaint(s): 


Patient is a 22 year old female with a medical history of psychosis who 

presents stating she was sent by [facility] for medication 

refill. Patient lost her Rx for Zyprexa 15mg and Trileptal 600mg medication (

last filled on 1/23/18) and has not had either medication since 2/11/18. 

Patient denies any symptoms at present, including SI, HI, and hallucinations. 

Patient states she has an upcoming appointment with Dr Jacobson on 2/20/18, 

however Medicaid will not fill any prescriptions for her until 2/23/18, which 

prompted ED visit. Otherwise: (-) fever (-) AMS.





Past Medical History


Reviewed: Historical Data, Nursing Documentation, Vital Signs


Vital Signs: 





 Last Vital Signs











Temp  98.1 F   02/14/18 17:44


 


Pulse  62   02/14/18 17:44


 


Resp  16   02/14/18 17:44


 


BP  106/65   02/14/18 17:44


 


Pulse Ox  98   02/14/18 17:44














- Medical History


PMH: Anxiety, Bipolar Disorder, Depression, Schizophrenia


   Denies: Diabetes, Hepatitis, HIV, HTN, Chronic Kidney Disease, Seizures, 

Sexually Transmitted Disease





- Surgical History


Surgical History: No Surg Hx





- Family History


Family History: States: Unknown Family Hx





- Living Arrangements


Living Arrangements: With Family





- Social History


Current smoker - smoking cessation education provided: No


Alcohol: None


Drugs: Denies





- Home Medications


Home Medications: 


 Ambulatory Orders











 Medication  Instructions  Recorded


 


Omeprazole [Omeprazole] 20 mg PO DAILY 08/24/17


 


Zolpidem Tartrate 5 mg PO HS 09/16/17


 


risperiDONE [RisperDAL] 2 mg PO BID 09/16/17


 


Divalproex Sodium [Divalproex 500 mg PO BID #60 tab.er.24h 09/27/17





Sodium ER]  


 


Sertraline [Zoloft] 100 mg PO DAILY #30 tab 09/27/17


 


risperiDONE [RisperDAL Tab] 2 mg PO HS #30 tab 09/27/17


 


traZODone [Desyrel] 100 mg PO HS PRN #30 tab 09/27/17














- Allergies


Allergies/Adverse Reactions: 


 Allergies











Allergy/AdvReac Type Severity Reaction Status Date / Time


 


No Known Allergies Allergy   Verified 02/14/18 17:44














Physical Exam





- Reviewed


Nursing Documentation Reviewed: Yes


Vital Signs Reviewed: Yes





- Physical Exam


Appears: Positive for: Well, Non-toxic, In Acute Distress


Head Exam: Positive for: NORMOCEPHALIC


Skin: Positive for: Normal Color, Warm, Dry


Eye Exam: Positive for: Normal appearance.  Negative for: Nystagmus, 

Conjunctival injection


ENT: Positive for: Pharynx Is (clear).  Negative for: Pharyngeal Erythema, 

Tonsillar Swelling


Neck: Positive for: Painless ROM, Supple


Cardiovascular/Chest: Positive for: Regular Rate, Rhythm


Respiratory: Positive for: Normal Breath Sounds.  Negative for: Decreased 

Breath Sounds, Respiratory Distress


Gastrointestinal/Abdominal: Positive for: Soft.  Negative for: Tenderness, 

Distended


Neurologic/Psych: Positive for: Alert, CNs II-XII, Oriented, Mood/Affect (

appropriate), Gait (steady).  Negative for: Motor/Sensory Deficits, Aphasia, 

Facial Droop





- ECG


O2 Sat by Pulse Oximetry: 98 (RA)


Pulse Ox Interpretation: Normal





Medical Decision Making


Medical Decision Making: 


Initial impression: medication refill





Consult to CRISIS placed @ 1900.





On re-evaluation, at 1930 CRISIS at bedside. Patient has no complaints at this 

time. CRISIS pending consult with house psychiatrist. 





On re-eval @ 2000, patient found to have eloped ED. Attempts by ED staff to 

locate patient unsuccessful.  





Disposition





- Clinical Impression


Clinical Impression: 


 Encounter for medication refill








- Patient ED Disposition


Is Patient to be Admitted: No





- Disposition


Disposition: Eloped


Disposition Time: 20:00


Condition: STABLE


Forms:  [facility] (English)

## 2018-02-17 ENCOUNTER — HOSPITAL ENCOUNTER (EMERGENCY)
Dept: HOSPITAL 31 - C.ER | Age: 23
Discharge: HOME | End: 2018-02-17
Payer: MEDICAID

## 2018-02-17 VITALS — TEMPERATURE: 98.2 F | OXYGEN SATURATION: 98 % | RESPIRATION RATE: 16 BRPM

## 2018-02-17 VITALS — HEART RATE: 85 BPM | DIASTOLIC BLOOD PRESSURE: 68 MMHG | SYSTOLIC BLOOD PRESSURE: 112 MMHG

## 2018-02-17 DIAGNOSIS — R10.9: Primary | ICD-10-CM

## 2018-02-17 DIAGNOSIS — K59.00: ICD-10-CM

## 2018-02-17 LAB
BACTERIA #/AREA URNS HPF: (no result) /[HPF]
BILIRUB UR-MCNC: NEGATIVE MG/DL
GLUCOSE UR STRIP-MCNC: NORMAL MG/DL
HCG,QUALITATIVE URINE: NEGATIVE
LEUKOCYTE ESTERASE UR-ACNC: (no result) LEU/UL
PH UR STRIP: 8 [PH] (ref 5–8)
PROT UR STRIP-MCNC: NEGATIVE MG/DL
RBC # UR STRIP: NEGATIVE /UL
SP GR UR STRIP: 1.02 (ref 1–1.03)
SQUAMOUS EPITHIAL: 7 /HPF (ref 0–5)
URINE NITRATE: NEGATIVE
UROBILINOGEN UR-MCNC: NORMAL MG/DL (ref 0.2–1)

## 2018-02-17 NOTE — US
HISTORY:

pain and constipation



COMPARISON:

None.



TECHNIQUE:

Sonographic evaluation of the abdomen.



FINDINGS:



LIVER:

Measures 15.4 cm.  Normal echogenicity of the liver parenchyma. No 

mass. No intrahepatic bile duct dilatation.



GALLBLADDER:

Unremarkable. No gallstones.



COMMON BILE DUCT:

Measures 3 mm. No stones. No dilatation.



PANCREAS:

Unremarkable as visualized. No mass. No ductal dilatation.



RIGHT KIDNEY:

Measures 10.5 x 3.6 x 4.2cm. Normal echogenicity. No calculus, mass, 

or hydronephrosis.



LEFT KIDNEY:

Measures 10.4 x 4.6 x 4.5cm. Normal echogenicity. No calculus, mass, 

or hydronephrosis.



SPLEEN:

Normal in size and contour. No mass.



AORTA:

No aneurysmal dilatation. 



IVC:

Unremarkable. 



OTHER FINDINGS:

None. 



IMPRESSION:

No evidence of cholelithiasis or cholecystitis.



No ultrasound evidence of nephrolithiasis or hydronephrosis.

## 2018-02-17 NOTE — C.PDOC
History Of Present Illness


22 years old female presents to ED with complaints of epigastric abdominal pain 

that began last week. Patient states the pain is on and off. She also reports 

constipation for 3 days, but patient had a small bowel movement this morning. 

Denies nausea, vomiting, fever or urinary symptoms.  


Time Seen by Provider: 02/17/18 11:36


Chief Complaint (Nursing): Abdominal Pain


History Per: Patient


History/Exam Limitations: no limitations


Onset/Duration Of Symptoms: Days (7)


Current Symptoms Are (Timing): Still Present


Location Of Pain/Discomfort: Epigastric


Quality Of Discomfort: Unable To Describe


Associated Symptoms: Constipation.  denies: Fever, Chills, Nausea, Vomiting, 

Diarrhea, Urinary Symptoms


Exacerbating Factors: None


Alleviating Factors: None


Recent travel outside of the [place]: No





Past Medical History


Reviewed: Historical Data, Nursing Documentation, Vital Signs


Vital Signs: 


 Last Vital Signs











Temp  98.2 F   02/17/18 11:27


 


Pulse  85   02/17/18 14:11


 


Resp  16   02/17/18 14:11


 


BP  112/68   02/17/18 14:11


 


Pulse Ox  98   02/17/18 14:11














- Medical History


PMH: Anxiety, Bipolar Disorder, Depression, Schizophrenia





- [facility] Procedures








FAMILY PSYCHOTHERAPY (03/10/17)


GROUP PSYCHOTHERAPY (09/16/17)


INDIVIDUAL PSYCHOTHERAPY, COGNITIVE-BEHAVIORAL (09/16/17)


INDIVIDUAL PSYCHOTHERAPY, SUPPORTIVE (09/16/17)


MEDICATION MANAGEMENT (03/10/17)


PSYCHIA INTERV/EVAL NEC (06/16/15)








Family History: States: Unknown Family Hx





- Social History


Hx Tobacco Use: No


Hx Alcohol Use: No


Hx Substance Use: No





- Immunization History


Hx Tetanus Toxoid Vaccination: No


Hx Influenza Vaccination: No


Hx Pneumococcal Vaccination: No





Review Of Systems


Constitutional: Negative for: Fever, Chills


Cardiovascular: Negative for: Chest Pain


Gastrointestinal: Positive for: Abdominal Pain (epigastric), Constipation.  

Negative for: Nausea, Vomiting, Diarrhea


Genitourinary: Negative for: Dysuria, Frequency, Hematuria


Neurological: Negative for: Weakness, Numbness





Physical Exam





- Physical Exam


Appears: Non-toxic, No Acute Distress


Skin: Normal Color, Warm, Dry, No Pale, No Rash


Head: Atraumatic, Normacephalic


Eye(s): bilateral: Normal Inspection, EOMI


Oral Mucosa: Moist


Neck: Normal ROM


Chest: Symmetrical, No Tenderness


Cardiovascular: Rhythm Regular


Respiratory: Normal Breath Sounds, No Decreased Breath Sounds, No Rales, No 

Rhonchi, No Wheezing


Gastrointestinal/Abdominal: Soft, No Tenderness, No Distention, No Guarding, No 

Rebound


Back: Normal Inspection, No CVA Tenderness


Extremity: Bilateral: Atraumatic, Normal Color And Temperature, Normal ROM


Neurological/Psych: Oriented x3, Normal Speech


Gait: Steady





ED Course And Treatment


O2 Sat by Pulse Oximetry: 98 (RA)


Pulse Ox Interpretation: Normal





- Other Rad


  ** abdomen


X-Ray: Viewed By Me, Read By Radiologist (Luis Carlos Bell MD)


Interpretation: IMPRESSION:  Mild constipation





- CT Scan/US


  ** abdomen


Other Rad Studies (CT/US): Read By Radiologist, Radiology Report Reviewed


CT/US Interpretation: Creator : Luis Carlos Bell MD.  Dictator : Luis Carlos Bell MD.   :  Approver : Luis Carlos Bell MD.  Approver2 :  

Report Date : 02/17/2018 13:38:10.  My Comment :  ******************************

*****************************************************.  HISTORY:  pain and 

constipation.  COMPARISON:  None.  TECHNIQUE:  Sonographic evaluation of the 

abdomen.  FINDINGS:  LIVER:  Measures 15.4 cm.  Normal echogenicity of the 

liver parenchyma. No mass. No intrahepatic bile duct dilatation.  GALLBLADDER:  

Unremarkable. No gallstones.  COMMON BILE DUCT:  Measures 3 mm. No stones. No 

dilatation.  PANCREAS:  Unremarkable as visualized. No mass. No ductal 

dilatation.  RIGHT KIDNEY:  Measures 10.5 x 3.6 x 4.2cm. Normal echogenicity. 

No calculus, mass, or hydronephrosis.  LEFT KIDNEY:  Measures 10.4 x 4.6 x 

4.5cm. Normal echogenicity. No calculus, mass, or hydronephrosis.  SPLEEN:  

Normal in size and contour. No mass.  AORTA:  No aneurysmal dilatation.  IVC:  

Unremarkable.  OTHER FINDINGS:  None.  IMPRESSION:  No evidence of 

cholelithiasis or cholecystitis.  No ultrasound evidence of nephrolithiasis or 

hydronephrosis.





Medical Decision Making


Medical Decision Making: 





Ordered US of abdomen, xray of abdomen, urine culture  urinalysis. Administered 

Pepcid and Maalox. 


Diagnostics reviewed showing constipation. Patient remained well in bed in no 

acute distress and without fever. She was observed to eat sandwich without any 

problem and had no pain. She is stable for discharge





Disposition


Counseled Patient/Family Regarding: Diagnosis, Need For Followup, Rx Given





- Disposition


Disposition: HOME/ ROUTINE


Disposition Time: 13:41


Condition: GOOD


Additional Instructions: 


Vaya a yap mdico o la clnica en 2-5 garza sin falta, para mas evaluacin. [place] 

los medicamentos lila indicado. Volver a la abel de emergencia en cualquier 

momento si los sntomas persisten o empeoran.


Prescriptions: 


Docusate [Colace] 100 mg PO TID PRN #30 cap


 PRN Reason: Constipation


Famotidine [Pepcid] 20 mg PO DAILY #20 tab


Instructions:  Constipation in Adults


Forms:  [employer] (Malaysian)


Print Language: Belarusian





- POA


Present On Arrival: None





- Clinical Impression


Clinical Impression: 


 Abdominal pain, Constipation








- PA / NP / Resident Statement


MD/DO has reviewed & agrees with the documentation as recorded.





- Scribe Statement


The provider has reviewed the documentation as recorded by the Shawibkenisha Hernadez





All medical record entries made by the Shawibkenisha were at my direction and 

personally dictated by me. I have reviewed the chart and agree that the record 

accurately reflects my personal performance of the history, physical exam, 

medical decision making, and the department course for this patient. I have 

also personally directed, reviewed, and agree with the discharge instructions 

and disposition.

## 2018-02-17 NOTE — RAD
HISTORY:

abd pain constipation  



COMPARISON:

No prior.



FINDINGS:



BOWEL:

Mild constipation. No obstruction. No free air. 



BONES:

Normal.



OTHER FINDINGS:

None.



IMPRESSION:

Mild constipation

## 2018-02-20 ENCOUNTER — HOSPITAL ENCOUNTER (INPATIENT)
Dept: HOSPITAL 31 - C.ER | Age: 23
LOS: 9 days | Discharge: HOME | DRG: 430 | End: 2018-03-01
Attending: PSYCHIATRY & NEUROLOGY | Admitting: PSYCHIATRY & NEUROLOGY
Payer: MEDICAID

## 2018-02-20 DIAGNOSIS — F41.9: ICD-10-CM

## 2018-02-20 DIAGNOSIS — F25.1: Primary | ICD-10-CM

## 2018-02-20 PROCEDURE — GZ56ZZZ INDIVIDUAL PSYCHOTHERAPY, SUPPORTIVE: ICD-10-PCS | Performed by: PSYCHIATRY & NEUROLOGY

## 2018-02-20 PROCEDURE — GZ58ZZZ INDIVIDUAL PSYCHOTHERAPY, COGNITIVE-BEHAVIORAL: ICD-10-PCS | Performed by: PSYCHIATRY & NEUROLOGY

## 2018-02-20 PROCEDURE — GZHZZZZ GROUP PSYCHOTHERAPY: ICD-10-PCS | Performed by: PSYCHIATRY & NEUROLOGY

## 2018-02-20 NOTE — PCM.BM
<Karina Calvin - Last Filed: 02/20/18 19:33>





Treatment Plan Problems





- Problems identified on initial assessmt


  ** Delusions


Date Initiated: 02/20/18


Time Initiated: 19:34


Assessment reference: NA


Status: Active





Treatment assets and liabiliti


Patient Assests: cooperative, self-reliant, ADL independent


Patient Liabilities: poor support system, other (non compliant with medications)





- Milieu Protocol


Maintain good personal hygiene: daily Encourage regular showers, daily Remind 

patient to perform daily oral care


Conduct patient checks and document Observation sheet: Q15 minutes


Maintain personal safety: every shift Educate patient to report safety concerns 

to staff, every shift Monitor environment for contraband/sharps


Medication safety: Monitor for expected outcome, potential side effects: every 

shift, Assess barriers to learning: every shift, Assess readiness for 

medication education: every shift





<Reyna Cline - Last Filed: 02/21/18 10:53>





Family Contact


Family involvement: Family/SO is involved


Family contact: Patient agrees to contact





- Goals for Treatment


Patient goals for treatment: "I don't know."





Discharge/Continuing Care





- Education Needs


Education Needs: Patient Medication, Patient Coping Skills





- Discharge


Discharge Criteria: Tolerates medication w/o severe side effects, Reduction of 

target symptoms


Discharge to:: Home, With Family





- Treatment Team Participation


Discussed with Family/SO: No


Was Patient/Family/SO present at Treatment Team Meeting: Yes





<Yuridia Lipscomb - Last Filed: 02/21/18 10:54>





- Diagnosis


(1) Schizophrenia


Status: Acute   


Interventions: 





02/21/18 10:54


* Assess/adjust medications daily and /or as needed


* See patient on an individual basis 7x/week to assess status of hallucinations


* Discuss risks, benefits, side effects and alternatives of medications


*

## 2018-02-21 VITALS — OXYGEN SATURATION: 98 %

## 2018-02-21 NOTE — PCM.PSYCH
Initial Psychiatric Evaluation





- Initial Psychiatric Evaluation


Type of Admission: Voluntary


Legal Status: Capacity


Chief Complaint (in patient's own words): 





I was hearing voices.'


History of Present Illness and Precipitating Events: 





This is a 23 yo  female, Single and unemployed, who currently lives with 

her mother, was escorted to the ED by the police, when they found her 

disheveled and unkempt and wandering on the streets.





Patient has a long history of schizoaffective disorder. Patient has history of 

multiple inpatient psychiatric admissions, last discharged from [facility]

, almost 6 months ago. Patient remained disorganized and internally preoccupied 

throughout the interview. Patient appeared paranoid and delusional. She reports 

of hearing  a lot of voices but could not elaborate. She reports of seeing 

shadows and demons. She remained partially mute and was looking around 

suspiciously during interview. Patient reports that she stopped taking her 

medications. Patient denies any drinking or any substance abuse. Per staff, 

since last night she is pacing back and forth in the hallways and talking to 

herself continuously and remained disheveled, unkempt and disorganized.





Past medical history:


None reported


Current Medications: 





Active Medications











Generic Name Dose Route Start Last Admin





  Trade Name Freq  PRN Reason Stop Dose Admin


 


Acetaminophen  650 mg  02/20/18 20:10  





  Tylenol 325mg Tab  PO   





  Q6 PRN   





  Fever >100.4 F   


 


Benztropine Mesylate  1 mg  02/20/18 22:00  02/20/18 21:58





  Cogentin  PO   Not Given





  HS PASQUALE   


 


Hydroxyzine HCl  25 mg  02/20/18 20:12  





  Atarax  PO   





  Q6 PRN   





  Agitation   


 


Lorazepam  1 mg  02/20/18 20:10  02/21/18 09:38





  Ativan  PO   1 mg





  Q6 PRN   Administration





  Anxiety   


 


Risperidone  1 mg  02/20/18 22:00  02/20/18 21:58





  Risperdal Tab  PO   Not Given





  HS PASQUALE   


 


Trazodone HCl  50 mg  02/20/18 22:00  02/20/18 21:58





  Desyrel  PO   Not Given





  HS PASQUALE   














Past Psychiatric History





- Past Psychiatric History


Previous Treatment History: Inpatient


Pertinent Medical Hx (Current Medical&Sleep Prob, Allergies): 





 Allergies











Allergy/AdvReac Type Severity Reaction Status Date / Time


 


No Known Allergies Allergy   Verified 02/20/18 18:54








 





Docusate [Colace] 100 mg PO TID PRN #30 cap 02/17/18 


Famotidine [Pepcid] 20 mg PO DAILY #20 tab 02/17/18 











Review of Systems





- Review of Systems


All systems: reviewed and no additional remarkable complaints except





- Psychiatric


Psychiatric: Anxiety, Auditory Hallucinations, Paranoia, Visual Hallucinations





Mental Status Examination





- Personal Presentation


Personal Presentation: Looks older than stated age





- Affect


Affect: Constricted, Flat





- Motor Activity


Motor Activity: Psychomotor Agitation





- Reliability in Providing Information


Reliability in Providing Information: Poor, due to alteration in thoughts, Poor

, due to altered mood





- Speech


Speech: Disorganized





- Mood


Mood: Anxious





- Formal Thought Process


Formal Thought Process: Hallucinations, Delusions, Paranoia, Loosening of 

associations





- Hallucinations/Delusions


Hallucinations: Visual, Auditory


Delusions: Persecution





- Obsessions/Compulsions


Obsessions: No


Compulsions: No





- Cognitive Functions


Orientation: Person, Place, Situation, Time


Sensorium: Alert


Attention/Concentration: Attentive


Abstract Thinking: [place]


Estimate of Intelligence: Below average


Judgement: Imparied, as evidence by: Poor judgement, Imparied, as evidence by: 

Lack of insight into illness





- Risk


Risk: Diminished functioning





- Strength & Assets Inventory


Strength & Assets Inventory: Family support





DSM 5 DX





- DSM 5


DSM 5 Diagnosis: 





Schizophrenia paranoid type 


r/o Schizoaffective disorder depressive type





- Recommended/Plan of Treatment


Treatment Recommendations and Plan of Treatment: 








Schizophrenia paranoid type 


r/o Schizoaffective disorder depressive type





CBT


Psychoeducation


Supportive therapy, group therapy, individual therapy


Risperdal 1 mg PO QHS


Cogentin 1 mg PO QHS


Trazodone 50 mg by mouth daily at bedtime 


PRN meds





- Smoking Cessation


Smoking Cessation Initiated: No

## 2018-02-22 NOTE — PCM.PYCHPN
Psychiatric Progress Note





- Psychiatric Progress Note


Patient seen today, length of contact: 16 min


Patient Chief Complaint: 





I am still hearing voices.'


Problems Identified/Issues Discussed: 





Patient seen and evaluated, chart reviewed and discussed with the nurse. 





Patient remained disorganized and internally preoccupied. Patient remained 

isolated, confined and withdrawn and still reports of hearing voices. Patient 

still appears paranoid and delusional. 





She is taking medication and denies any side effects.





She needs more time for stabilization.





Supportive therapy and psychoeducation were given.


Medication Change: Yes (D/C Risperdal, Start Haldol)


Medical Record Reviewed: Yes





Mental Status Examination





- Cognitive Function


Orientation: Person, Place, Situation, Time


Memory: Intact


Attention: Poor


Concentration: Poor


Association: Loose


Fund of Knowledge: Poor





- Mood


Mood: Anxious





- Affect


Affect: Constricted, Flat





- Formal Thought Process


Formal Thought Process: Hallucinations, Delusions, Paranoia, Loosening of 

associations





- Suicidal Ideation


Suicidal Ideation: No





- Homicidal Ideation


Homicidal Ideation: No





Goal/Treatment Plan





- Goal/Treatment Plan


Need for Continued Stay: Severe depression anxiety, Severe functional impairment


Progress Toward Problem(s) and Goals/Treatment Plan: 








Schizophrenia paranoid type 


r/o Schizoaffective disorder depressive type





CBT


Psychoeducation


Supportive therapy, group therapy, individual therapy


d/c Risperdal 1 mg PO QHS


Cogentin 1 mg PO QHS


Trazodone 50 mg by mouth daily at bedtime 


PRN meds


Start Haldol 5 mg pO BID





- Smoking Cessation


Smoking Cessation Initiated: No

## 2018-02-23 NOTE — PCM.PYCHPN
Psychiatric Progress Note





- Psychiatric Progress Note


Patient seen today, length of contact: 15 min


Patient Chief Complaint: 





I am still hearing voices.'


Problems Identified/Issues Discussed: 








Patient seen and evaluated, chart reviewed and discussed with the nurse. As per 

the staff patient is still talking to herself and still pacing back and forth 

in the hallways. However she has started coming out of her room. She still 

appears disorganized and internally preoccupied. She is not talking to anyone 

and she remained isolated, confined and withdrawn. She still reports of hearing 

voices and she still appears paranoid and delusional. She is taking medication 

and denies any side effects.





She needs more time for stabilization.





Supportive therapy and psychoeducation were given.


Medication Change: Yes (Increase Haldol)


Medical Record Reviewed: Yes





Mental Status Examination





- Cognitive Function


Orientation: Person, Place, Situation, Time


Memory: Intact


Attention: Poor


Concentration: Poor


Association: Loose


Fund of Knowledge: Poor





- Mood


Mood: Anxious





- Affect


Affect: Constricted, Flat





- Speech


Speech: Soft





- Formal Thought Process


Formal Thought Process: Hallucinations, Delusions, Paranoia, Loosening of 

associations





- Suicidal Ideation


Suicidal Ideation: No





- Homicidal Ideation


Homicidal Ideation: No





Goal/Treatment Plan





- Goal/Treatment Plan


Need for Continued Stay: Severe depression anxiety, Severe functional impairment


Progress Toward Problem(s) and Goals/Treatment Plan: 








Schizophrenia paranoid type 


r/o Schizoaffective disorder depressive type





CBT


Psychoeducation


Supportive therapy, group therapy, individual therapy


Haldol 10 mg by mouth twice a day


Cogentin 1 mg by mouth twice a day


Klonopin 1 mg by mouth twice a day


Trazodone 50 mg by mouth daily at bedtime 


PRN meds





- Smoking Cessation


Smoking Cessation Initiated: No

## 2018-02-24 NOTE — PCM.PYCHPN
Psychiatric Progress Note





- Psychiatric Progress Note


Patient seen today, length of contact: 15 min


Patient Chief Complaint: 





I'm feeling much better


Problems Identified/Issues Discussed: 





Patient seen, chart reviewed, case discussed with the staff.





Issues related to illness and treatment were discussed with the patient.





Reported compliant with treatment with no adverse affects.





Tolerating treatment very well.





Patient denied any auditory or visual hallucinations, denied any delusions. 

Still appeared internally preoccupied. Most of the time isolated in her room.





At the time of evaluation, patient was awake alert oriented 3, no suicidal 

ideations or homicidal ideations.





Aftercare discussed with the patient.


Medical Problems: 





None reported


Diagnostic Results: 





Reviewed


DSM 5 Symptoms Update: 





Some improvement with treatment


Medication Change: No


Medical Record Reviewed: Yes





Mental Status Examination





- Cognitive Function


Orientation: Person, Place, Situation, Time


Memory: Intact


Attention: WNL


Concentration: WNL


Association: WNL


Fund of Knowledge: WNL


Decription of patient's judgement and insights: 





Fair





- Mood


Mood: Anxious, Other





- Affect


Affect: Other (Appropriate)





- Speech


Speech: Soft





- Formal Thought Process


Formal Thought Process: Loosening of associations, Other (Appeared internally 

preoccupied)





- Suicidal Ideation


Suicidal Ideation: No





- Homicidal Ideation


Homicidal Ideation: No





Goal/Treatment Plan





- Goal/Treatment Plan


Need for Continued Stay: Remain at risks for inpatient hospitalization, 

Discharge may exacerbated symptoms, Severe functional impairment


Progress Toward Problem(s) and Goals/Treatment Plan: 





Patient education


Supportive therapy


Continue treatment as before


Patient will go to [facility] for follow-up after 

discharge from the hospital.


Estimated Date of D/C: 02/28/18





- Smoking Cessation


Smoking Cessation Initiated: No

## 2018-02-25 NOTE — PCM.PYCHPN
Psychiatric Progress Note





- Psychiatric Progress Note


Patient seen today, length of contact: 15 min


Patient Chief Complaint: 





 I'm feeling much better


Problems Identified/Issues Discussed: 








Patient seen, chart reviewed, case discussed with the staff.





Issues related to illness and treatment were discussed with the patient.





Reported compliant with treatment with no adverse affects.





Tolerating treatment very well.





Patient denied any auditory or visual hallucinations, denied any delusions. 





According to staff patient was more visible on the unit and more social.





At the time of evaluation, patient was awake alert oriented 3, no suicidal 

ideations or homicidal ideations.





Aftercare discussed with the patient.


Medical Problems: 





 None reported


Diagnostic Results: 





 Reviewed


DSM 5 Symptoms Update: 





Improving with treatment


Medication Change: No


Medical Record Reviewed: Yes





Mental Status Examination





- Cognitive Function


Orientation: Person, Place, Situation, Time


Memory: Intact


Attention: WNL


Concentration: WNL


Association: WNL


Fund of Knowledge: WNL


Decription of patient's judgement and insights: 





 Fair





- Mood


Mood: Anxious (Much less than before), Other





- Affect


Affect: Other (Appropriate)





- Speech


Speech: Soft





- Formal Thought Process


Formal Thought Process: Loosening of associations





- Suicidal Ideation


Suicidal Ideation: No





- Homicidal Ideation


Homicidal Ideation: No





Goal/Treatment Plan





- Goal/Treatment Plan


Need for Continued Stay: Remain at risks for inpatient hospitalization, 

Discharge may exacerbated symptoms, Severe functional impairment


Progress Toward Problem(s) and Goals/Treatment Plan: 








Patient education


Supportive therapy


Continue treatment as before


Patient will go to [facility] for follow-up after 

discharge from the hospital.


Estimated Date of D/C: 02/28/18





- Smoking Cessation


Smoking Cessation Initiated: No

## 2018-02-26 NOTE — PCM.PYCHPN
Psychiatric Progress Note





- Psychiatric Progress Note


Patient seen today, length of contact: 15 min


Patient Chief Complaint: 





I am still hearing voices.'


Problems Identified/Issues Discussed: 








Patient seen and evaluated, chart reviewed and discussed with the nurse. 





Today pt. appears more organized and less internally preoccupied than before. 

As per the staff patient is still talking to herself and still pacing back and 

forth in the hallways. However she has started coming out of her room.  She is 

not talking to anyone and she remained isolated, confined and withdrawn. She 

still reports of hearing voices and she still appears paranoid and delusional. 





She is taking medication and denies any side effects.


She needs more time for stabilization.


Supportive therapy and psychoeducation were given.


Medication Change: Yes (d/c klonopin, start Zoloft)


Medical Record Reviewed: Yes





Mental Status Examination





- Cognitive Function


Orientation: Person, Place, Situation, Time


Memory: Intact


Attention: WNL


Concentration: Poor


Association: Loose


Fund of Knowledge: WNL





- Mood


Mood: Depressed, Anxious (Much less than before)





- Affect


Affect: Constricted, Depressed





- Speech


Speech: Soft





- Formal Thought Process


Formal Thought Process: Delusions, Paranoia, Loosening of associations





- Suicidal Ideation


Suicidal Ideation: No





- Homicidal Ideation


Homicidal Ideation: No





Goal/Treatment Plan





- Goal/Treatment Plan


Need for Continued Stay: Remain at risks for inpatient hospitalization, 

Discharge may exacerbated symptoms, Severe functional impairment


Progress Toward Problem(s) and Goals/Treatment Plan: 








Schizophrenia paranoid type 


r/o Schizoaffective disorder depressive type





CBT


Psychoeducation


Supportive therapy, group therapy, individual therapy


Haldol 10 mg by mouth twice a day


Cogentin 1 mg by mouth twice a day


d/c Klonopin 1 mg by mouth twice a day


Trazodone 50 mg by mouth daily at bedtime 


Ativan 1 mg PO Q6hr prn


PRN meds


Start Zoloft 25 mg


Estimated Date of D/C: 02/28/18





- Smoking Cessation


Smoking Cessation Initiated: No

## 2018-02-27 NOTE — PCM.PYCHPN
Psychiatric Progress Note





- Psychiatric Progress Note


Patient seen today, length of contact: 15 min


Patient Chief Complaint: 





I am feeling little better.'


Problems Identified/Issues Discussed: 








Patient seen and evaluated, chart reviewed and discussed with the nurse. 





As per the staff patient has started socializing with peers and more organized 

and less internally preoccupied than before. However, she is still pacing back 

and forth in the hallways. She reports improvement in her voices and reports 

improvement in her mood. 





She is taking medication and denies any side effects.


She needs more time for stabilization.


Supportive therapy and psychoeducation were given.


Medication Change: Yes (increase Zoloft)


Medical Record Reviewed: Yes





Mental Status Examination





- Cognitive Function


Orientation: Person, Place, Situation, Time


Memory: Intact


Attention: WNL


Concentration: Poor


Association: Loose


Fund of Knowledge: WNL





- Mood


Mood: Depressed, Anxious (Much less than before)





- Affect


Affect: Constricted, Depressed





- Speech


Speech: Soft





- Formal Thought Process


Formal Thought Process: Loosening of associations





- Suicidal Ideation


Suicidal Ideation: No





- Homicidal Ideation


Homicidal Ideation: No





Goal/Treatment Plan





- Goal/Treatment Plan


Need for Continued Stay: Remain at risks for inpatient hospitalization, 

Discharge may exacerbated symptoms, Severe functional impairment


Progress Toward Problem(s) and Goals/Treatment Plan: 








Schizophrenia paranoid type 


r/o Schizoaffective disorder depressive type





CBT


Psychoeducation


Supportive therapy, group therapy, individual therapy


Haldol 10 mg by mouth twice a day


Cogentin 1 mg by mouth twice a day


Trazodone 50 mg by mouth daily at bedtime 


Ativan 1 mg PO Q6hr prn


PRN meds


Zoloft 75 mg


Estimated Date of D/C: 02/28/18





- Smoking Cessation


Smoking Cessation Initiated: No

## 2018-02-28 NOTE — PCM.BM
<JavedReyna Lee - Last Filed: 02/28/18 10:08>





Treatment Plan Problems





- Problems identified on initial assessmt


  ** Delusions


Date Initiated: 02/20/18


Time Initiated: 19:34


Assessment reference: NA


Status: Active





Treatment assets and liabiliti


Patient Assests: cooperative, self-reliant, ADL independent


Patient Liabilities: poor support system, other (non compliant with medications)





- Milieu Protocol


Maintain good personal hygiene: daily Encourage regular showers, daily Remind 

patient to perform daily oral care


Conduct patient checks and document Observation sheet: Q15 minutes


Maintain personal safety: every shift Educate patient to report safety concerns 

to staff, every shift Monitor environment for contraband/sharps


Medication safety: Monitor for expected outcome, potential side effects: every 

shift, Assess barriers to learning: every shift, Assess readiness for 

medication education: every shift


Milieu Narrative: 








Schizophrenia paranoid type 


r/o Schizoaffective disorder depressive type





CBT


Psychoeducation


Supportive therapy, group therapy, individual therapy


Haldol 10 mg by mouth twice a day


Cogentin 1 mg by mouth twice a day


Trazodone 50 mg by mouth daily at bedtime 


Ativan 1 mg PO Q6hr prn


PRN meds


Zoloft 75 mg





Family Contact


Family involvement: Family/SO is involved


Family contact: Patient agrees to contact





- Goals for Treatment


Patient goals for treatment: "I don't know."





Discharge/Continuing Care





- Education Needs


Education Needs: Patient Medication, Patient Coping Skills





- Discharge


Discharge Criteria: Tolerates medication w/o severe side effects, Reduction of 

target symptoms


Discharge to:: Home, With Family





- Treatment Team Participation


Patient/Family/SO Statement: 








Schizophrenia paranoid type 


r/o Schizoaffective disorder depressive type





CBT


Psychoeducation


Supportive therapy, group therapy, individual therapy


Haldol 10 mg by mouth twice a day


Cogentin 1 mg by mouth twice a day


Trazodone 50 mg by mouth daily at bedtime 


Ativan 1 mg PO Q6hr prn


PRN meds


Zoloft 75 mg


Discussed with Family/SO: No


Was Patient/Family/SO present at Treatment Team Meeting: Yes





Treatment Plan Review





- Problem


  ** Delusions


Time Initiated: 19:34





- Discharge / Continuing Care


Discharge to:: Home, With Family


Behavioral Health Services: Partial hospital


Health Needs: Medications/Rx





<Yuridia Lipscomb - Last Filed: 02/28/18 10:47>





- Diagnosis


(1) Schizophrenia


Status: Acute   


Interventions: 





02/28/18 10:47


* Assess/adjust medications daily and /or as needed


* See patient on an individual basis 7x/week to assess status of hallucinations


* Discuss risks, benefits, side effects and alternatives of medications


* 








<Gina Tobar - Last Filed: 02/28/18 13:27>





Treatment Plan Review





- Problem


  ** Delusions


Date Initiated: 02/28/18


Time Initiated: 12:00


Progress toward outcomes: improved

## 2018-02-28 NOTE — PCM.PYCHPN
Psychiatric Progress Note





- Psychiatric Progress Note


Patient seen today, length of contact: 15 min


Patient Chief Complaint: 





I am feeling little better.'


Problems Identified/Issues Discussed: 








Patient seen and evaluated, chart reviewed and discussed with the nurse. 





She reports improvement in her voices and reports improvement in her mood. She 

appears more organized and kempt. She denies any AVH or any SI/HI. However, she 

is still pacing back and forth in the hallways.





She is taking medication and denies any side effects.


She needs more time for stabilization.


Supportive therapy and psychoeducation were given.


Medication Change: Yes (increase Zoloft)


Medical Record Reviewed: Yes





Mental Status Examination





- Cognitive Function


Orientation: Person, Place, Situation, Time


Memory: Intact


Attention: WNL


Concentration: Poor


Association: WNL


Fund of Knowledge: WNL





- Mood


Mood: Depressed, Anxious (Much less than before)





- Affect


Affect: Constricted, Depressed





- Speech


Speech: Soft





- Formal Thought Process


Formal Thought Process: No Impairment





- Suicidal Ideation


Suicidal Ideation: No





- Homicidal Ideation


Homicidal Ideation: No





Goal/Treatment Plan





- Goal/Treatment Plan


Need for Continued Stay: Remain at risks for inpatient hospitalization, 

Discharge may exacerbated symptoms, Severe functional impairment


Progress Toward Problem(s) and Goals/Treatment Plan: 








Schizophrenia paranoid type 


r/o Schizoaffective disorder depressive type





CBT


Psychoeducation


Supportive therapy, group therapy, individual therapy


Haldol 10 mg by mouth twice a day


Cogentin 1 mg by mouth twice a day


Trazodone 50 mg by mouth daily at bedtime 


Ativan 1 mg PO Q6hr prn


PRN meds


Zoloft 100 mg


Estimated Date of D/C: 02/28/18





- Smoking Cessation


Smoking Cessation Initiated: No

## 2018-03-01 VITALS
DIASTOLIC BLOOD PRESSURE: 58 MMHG | TEMPERATURE: 98.1 F | SYSTOLIC BLOOD PRESSURE: 101 MMHG | RESPIRATION RATE: 18 BRPM | HEART RATE: 73 BPM

## 2018-03-01 NOTE — PCM.PYCHDC
Mental Status Examination





- Mental Status Examination


Orientation: Person, Place, Situation, Time


Memory: Intact


Mood: Neutral


Affect: Constricted


Speech: Soft


Attention: WNL


Concentration: WNL


Association: WNL


Fund of Knowledge: WNL


Formal Thought Process: No Impairment


Description of patient's judgement and insight: 





good, fair


Psychotic Thoughts and Behaviors: 





denies any AVH


Suicidal Ideation: No


Current Homicidal Ideation?: No





Discharge Summary





- Discharge Note


Reason for Hospitalization: 








This is a 23 yo  female, Single and unemployed, who currently lives with 

her mother, was escorted to the ED by the police, when they found her 

disheveled and unkempt and wandering on the streets.





Patient has a long history of schizoaffective disorder. Patient has history of 

multiple inpatient psychiatric admissions, last discharged from [facility]

, almost 6 months ago. Patient remained disorganized and internally preoccupied 

throughout the interview. Patient appeared paranoid and delusional. She reports 

of hearing  a lot of voices but could not elaborate. She reports of seeing 

shadows and demons. She remained partially mute and was looking around 

suspiciously during interview. Patient reports that she stopped taking her 

medications. Patient denies any drinking or any substance abuse. Per staff, 

since last night she is pacing back and forth in the hallways and talking to 

herself continuously and remained disheveled, unkempt and disorganized.








Consultations:: List each consultation separately and include:  1. Reason for 

request.  2. Findings.  3. Follow-up


Summary of Hospital Course include:: 1. Description of specific treatment plan 

utilized for patients during their course of treatmen.  2. Summarize the time-

course for resolution of acute symptoms and/or regressed behaviors.  3. 

Describe issues identified and worked on during hospitalization.  4. Describe 

medication utilized.  5. Describe medical problems identified and treated.  6. 

Reassessment of suicide risk


Summary of Hospital Course: 


During the course of her stay, patient (pt) started progressively improving and 

she no longer remained irritable, anxious and paranoid. Her mood and paranoia 

were improved and she started attending groups and meetings and started 

socializing. 





Patient denied any feelings of hopelessness, helplessness, and worthlessness, 

denied any problem with the sleep or appetite, denied suicidal ideation or 

homicidal ideation. Pt denied any auditory or visual hallucinations.  





Some changes were made in her current medications and patient was discharged on 

following medications. She tolerated these medications very well and denied any 

side effects. Pt is to return home with mother and to attend day program at 

[facility].











- Diagnosis


(1) Schizophrenia


Status: Acute   





- Final Diagnosis (DSM 5)


Condition upon Discharge: STABLE


DSM 5: 





Schizoaffective disorder depressive type





Disposition: HOME/ ROUTINE


Follow-up Treatment Plan: 


Education:


Pt was educated and counseled about the risks and benefits of taking and not 

taking medications.  





Pt was educated and counseled about the risks of drinking and abusing drugs.   





Pt was educated and counseled to go to the ER or call 911 if pt develop 

suicidal ideation or homicidal ideation, worsening of symptoms or severe side 

effects of the meds.


   





Prescriptions/Medication Reconciliation: 


Benztropine [Cogentin] 1 mg PO BID #60 tab


Haloperidol [Haldol] 10 mg PO BID #60 tab


Sertraline [Zoloft] 100 mg PO DAILY #30 tab


traZODone [Desyrel] 50 mg PO HS PRN #30 tab


 PRN Reason: Insomnia





- Smoking Cessation


Smoking Cessation Medication prescribed: No





- Antipsychotic Medications


Pt discharged on 2 or more routine antipsychotic medications: No

## 2018-08-24 ENCOUNTER — HOSPITAL ENCOUNTER (EMERGENCY)
Dept: HOSPITAL 14 - H.ER | Age: 23
LOS: 3 days | Discharge: ANOTHER HOSPITAL | End: 2018-08-27
Payer: MEDICAID

## 2018-08-24 DIAGNOSIS — I10: ICD-10-CM

## 2018-08-24 DIAGNOSIS — F31.9: ICD-10-CM

## 2018-08-24 DIAGNOSIS — Z86.73: ICD-10-CM

## 2018-08-24 DIAGNOSIS — F20.0: Primary | ICD-10-CM

## 2018-08-24 PROCEDURE — 83992 ASSAY FOR PHENCYCLIDINE: CPT

## 2018-08-24 PROCEDURE — 80324 DRUG SCREEN AMPHETAMINES 1/2: CPT

## 2018-08-24 PROCEDURE — 80346 BENZODIAZEPINES1-12: CPT

## 2018-08-24 PROCEDURE — 80048 BASIC METABOLIC PNL TOTAL CA: CPT

## 2018-08-24 PROCEDURE — 85025 COMPLETE CBC W/AUTO DIFF WBC: CPT

## 2018-08-24 PROCEDURE — 96372 THER/PROPH/DIAG INJ SC/IM: CPT

## 2018-08-24 PROCEDURE — 80320 DRUG SCREEN QUANTALCOHOLS: CPT

## 2018-08-24 PROCEDURE — 80345 DRUG SCREENING BARBITURATES: CPT

## 2018-08-24 PROCEDURE — 99285 EMERGENCY DEPT VISIT HI MDM: CPT

## 2018-08-24 PROCEDURE — 84703 CHORIONIC GONADOTROPIN ASSAY: CPT

## 2018-08-24 PROCEDURE — 80349 CANNABINOIDS NATURAL: CPT

## 2018-08-24 PROCEDURE — 80329 ANALGESICS NON-OPIOID 1 OR 2: CPT

## 2018-08-24 PROCEDURE — 81003 URINALYSIS AUTO W/O SCOPE: CPT

## 2018-08-24 PROCEDURE — 80361 OPIATES 1 OR MORE: CPT

## 2018-08-24 PROCEDURE — 80353 DRUG SCREENING COCAINE: CPT

## 2018-08-24 PROCEDURE — 80358 DRUG SCREENING METHADONE: CPT

## 2018-08-25 LAB
APAP SERPL-MCNC: < 10 UG/ML (ref 10–30)
BACTERIA #/AREA URNS HPF: (no result) /[HPF]
BASOPHILS # BLD AUTO: 0 K/UL (ref 0–0.2)
BASOPHILS NFR BLD: 0.3 % (ref 0–2)
BILIRUB UR-MCNC: NEGATIVE MG/DL
BUN SERPL-MCNC: 12 MG/DL (ref 7–17)
CALCIUM SERPL-MCNC: 9.4 MG/DL (ref 8.4–10.2)
COLOR UR: YELLOW
EOSINOPHIL # BLD AUTO: 0.1 K/UL (ref 0–0.7)
EOSINOPHIL NFR BLD: 1 % (ref 0–4)
ERYTHROCYTE [DISTWIDTH] IN BLOOD BY AUTOMATED COUNT: 11.7 % (ref 11.5–14.5)
GFR NON-AFRICAN AMERICAN: > 60
GLUCOSE UR STRIP-MCNC: (no result) MG/DL
HGB BLD-MCNC: 13.1 G/DL (ref 12–16)
LEUKOCYTE ESTERASE UR-ACNC: (no result) LEU/UL
LYMPHOCYTES # BLD AUTO: 3.1 K/UL (ref 1–4.3)
LYMPHOCYTES NFR BLD AUTO: 31.6 % (ref 20–40)
MCH RBC QN AUTO: 31.6 PG (ref 27–31)
MCHC RBC AUTO-ENTMCNC: 33.8 G/DL (ref 33–37)
MCV RBC AUTO: 93.4 FL (ref 81–99)
MONOCYTES # BLD: 0.8 K/UL (ref 0–0.8)
MONOCYTES NFR BLD: 8.1 % (ref 0–10)
NEUTROPHILS # BLD: 5.8 K/UL (ref 1.8–7)
NEUTROPHILS NFR BLD AUTO: 59 % (ref 50–75)
NRBC BLD AUTO-RTO: 0.1 % (ref 0–0)
PH UR STRIP: 5 [PH] (ref 5–8)
PLATELET # BLD: 354 K/UL (ref 130–400)
PMV BLD AUTO: 7.5 FL (ref 7.2–11.7)
PROT UR STRIP-MCNC: 30 MG/DL
RBC # BLD AUTO: 4.16 MIL/UL (ref 3.8–5.2)
RBC # UR STRIP: NEGATIVE /UL
SALICYLATES SERPL-MCNC: < 1 MG/DL
SP GR UR STRIP: 1.02 (ref 1–1.03)
SQUAMOUS EPITHIAL: 1 /HPF (ref 0–5)
URINE CLARITY: (no result)
UROBILINOGEN UR-MCNC: (no result) MG/DL (ref 0.2–1)
WBC # BLD AUTO: 9.9 K/UL (ref 4.8–10.8)

## 2018-08-25 NOTE — ED PDOC
HPI: Psych/Substance Abuse


Time Seen by Provider: 08/24/18 23:23


Chief Complaint (Nursing): Substance Abuse


Chief Complaint (Provider): Psych 


ED Caveat: Uncooperative


History Per: Patient, EMS


Onset/Duration Of Symptoms: Hrs (today)


Current Symptoms Are (Timing): Still Present


Additional Complaint(s): 





Melinda Doyle is a 23 year old female, with unknown past medical history, who was 

brought to the emergency department by EMS who state patient was running on the 

street without pants. Patient states she was running away from [facility]. 

Per family, patient states she likes being in the hospital and was recently 

ad[place] [facility]. Patient is uncooperative with history taking and is refusing 

to answer questions. 





PMD: None provided. 





Past Medical History


Reviewed: Historical Data, Nursing Documentation, Vital Signs, Unable To Obtain


Vital Signs: 





 Last Vital Signs











Temp  98.4 F   08/24/18 23:12


 


Pulse  66   08/24/18 23:12


 


Resp  16   08/24/18 23:12


 


BP  116/64   08/24/18 23:12


 


Pulse Ox  99   08/24/18 23:12














- Family History


Family History: States: Unknown Family Hx





- Allergies


Allergies/Adverse Reactions: 


 Allergies











Allergy/AdvReac Type Severity Reaction Status Date / Time


 


Unobtainable Allergy   Verified 08/24/18 23:12














Review of Systems


Review Of Systems: ROS cannot be obtained secondary to pt's inabilty to answer 

questions.





Physical Exam





- Reviewed


Nursing Documentation Reviewed: Yes


Vital Signs Reviewed: Yes





- Physical Exam


Appears: Positive for: No Acute Distress


Head Exam: Positive for: ATRAUMATIC, NORMAL INSPECTION, NORMOCEPHALIC


Skin: Positive for: Normal Color, Warm, Dry


Eye Exam: Positive for: Normal appearance, EOMI, PERRL


Neck: Positive for: Painless ROM


Cardiovascular/Chest: Positive for: Regular Rate, Rhythm.  Negative for: Murmur


Respiratory: Positive for: Normal Breath Sounds.  Negative for: Respiratory 

Distress


Gastrointestinal/Abdominal: Positive for: Normal Exam, Soft.  Negative for: 

Tenderness


Extremity: Positive for: Normal ROM (all extremities).  Negative for: Deformity


Neurologic/Psych: Positive for: Alert.  Negative for: Oriented (cannot assess 

orientation, patient is uncooperative)





- Laboratory Results


Result Diagrams: 


 08/25/18 01:10





 08/25/18 01:10





- ECG


O2 Sat by Pulse Oximetry: 99 (RA)


Pulse Ox Interpretation: Normal





Medical Decision Making


Medical Decision Making: 





Time: 23:23


Initial Impression: 22 y/o female with nonspecific psychiatric issue at this 

time. Patient is uncooperative and not allowing us to draw blood. Required 4-

point restraints and Ativan, will also require crisis evaluation.





Initial Plan:





--Acetaminophen


--Alcohol serum


--BMP


--Drug screen, urine


--Salicylate


--CBC w/ differential


--Ativan 2 mg IM


--Haldol 5 mg IM


--1:1 Observation


--Restraints


--Urinalysis 


--Reevaluation








06:25


-Patient will be [place] [facility]. Patient becoming agitated and trying to hit 1

:1. Requiring additional four-point restraints and Ativan.








07:00


-Patient will be signed out to Dr. Valentin, pending [facility] screen.





--------------------------------------------------------------------------------

-----





Scribe Attestation:


Documented by Rafael Bianchi, acting as a scribe for Heri Arshad MD.





Provider Scribe Attestation:


All medical record entries made by the Scribe were at my direction and 

personally dictated by me. I have reviewed the chart and agree that the record 

accurately reflects my personal performance of the history, physical exam, 

medical decision making, and the department course for this patient. I have 

also personally directed, reviewed, and agree with the discharge instructions 

and disposition.





Disposition





- Clinical Impression


Clinical Impression: 


 Schizophrenia, Bipolar 1 disorder








- Patient ED Disposition


Is Patient to be Admitted: Transfer of Care





- Disposition


Disposition: Transfer of Care


Disposition Time: 07:00


Condition: STABLE


Forms:  [facility] (English)


Patient Signed Over To: Sam Valentin


Handoff Comments: pending [facility] eval

## 2018-08-25 NOTE — RAD
Date of service: 



08/25/2018



HISTORY:

psychosis  



COMPARISON:

No prior. 



FINDINGS:



LUNGS:

No active pulmonary disease.



PLEURA:

No significant pleural effusion identified, no pneumothorax apparent.



CARDIOVASCULAR:

Normal.



OSSEOUS STRUCTURES:

No significant abnormalities.



VISUALIZED UPPER ABDOMEN:

Normal.



OTHER FINDINGS:

None.



IMPRESSION:

No active disease.

## 2018-08-25 NOTE — ED PDOC
- Laboratory Results


Result Diagrams: 


 08/25/18 01:10





 08/25/18 01:10





- ECG


O2 Sat by Pulse Oximetry: 99 (RA)


Pulse Ox Interpretation: Normal





Medical Decision Making


Medical Decision Making: 


Time: 1500


-- Received endorsement from Dr. Valentin. Patient medically cleared on earlier 

shift and is accepted by [facility] for inpatient involuntary 

psychiatric unit, pending bed availability. 





1930 Pt agitated. Ativan 2mg po.





12am Endorsed to Dr Edmond. Pending bed availability. STable


________________________________________________________________________________

_____________________


Scribe Attestation:


Documented by Geraldo Macario acting as a scribe for Dr. Letha Dorsey. 





Provider Scribe Attestation:


All medical record entries made by the Scribe were at my direction and 

personally dictated by me. I have reviewed the chart and agree that the record 

accurately reflects my personal performance of the history, physical exam, 

medical decision making, and the department course for this patient. I have 

also personally directed, reviewed, and agree with the discharge instructions 

and disposition.





Disposition





- Clinical Impression


Clinical Impression: 


 Schizophrenia, Bipolar 1 disorder








- POA


Present On Arrival: None





- Disposition


Disposition: Transfer of Care


Disposition Time: 00:00


Condition: STABLE


Forms:  [facility] (English)

## 2018-08-25 NOTE — ED PDOC
- Laboratory Results


Result Diagrams: 


 08/25/18 01:10





 08/25/18 01:10





- ECG


O2 Sat by Pulse Oximetry: 100





Medical Decision Making


Medical Decision Making: 





Medically stable for psychiatric admission





Disposition





- Clinical Impression


Clinical Impression: 


 Schizophrenia, Bipolar 1 disorder








- POA


Present On Arrival: None





- Disposition


Disposition: Transfer of Care


Disposition Time: 15:00


Condition: FAIR


Forms:  [facility] (English)


Patient Signed Over To: Letha Dorsey

## 2018-08-26 NOTE — ED PDOC
- Laboratory Results


Result Diagrams: 


 08/25/18 01:10





 08/25/18 01:10





- ECG


O2 Sat by Pulse Oximetry: 99 (RA)





Medical Decision Making


Medical Decision Making: 





Time: 00:00


--Patient has already been accepted and was cleared medically on previous 

shift. Patient care endorsed from Dr. Dorsey to Dr. Edmond pending [facility] bed 

availability.








--------------------------------------------------------------------------------

-----------------


Scribe Attestation:


Documented by Sb Delacruz acting as a scribe for Susanne Edmond MD.





Provider Scribe Attestation:


All medical record entries made by the Scribe were at my direction and 

personally dictated by me. I have reviewed the chart and agree that the record 

accurately reflects my personal performance of the history, physical exam, 

medical decision making, and the department course for this patient. I have 

also personally directed, reviewed, and agree with the discharge instructions 

and disposition.





Disposition





- Clinical Impression


Clinical Impression: 


 Schizophrenia, Bipolar 1 disorder








- POA


Present On Arrival: None





- Disposition


Disposition: Transfer of Care


Disposition Time: 07:00


Condition: STABLE


Patient Signed Over To: Sam Valentin

## 2018-08-26 NOTE — CP.PCM.CON
History of Present Illness





- History of Present Illness


History of Present Illness: 





This is  22 yr old female with h/o bipolar disorder admitted because of bizarre 

behaviors and psychotic agitation and very poor historian with disorganized 

thinking.pt has been very labile and anxious to leave and says ."you are not my 

doctor."


pt has remained with poor insight regarding her psychotic symptoms and says , I 

am fine " does not want to be admitted voluntarily.pt has been seen by screener 

from [facility] and accepted for involuntary admission.





Past Patient History





- Past Social History


Smoking Status: Unknown If Ever Smoked





- CARDIAC


Hx Cardiac Disorders:  (unable to assess)


Hx Hypertension:  (unable to assess)





- PULMONARY


Hx Tuberculosis:  (unable to assess)





- NEUROLOGICAL


HX Cerebrovascular Accident:  (unable to assess)


Hx Seizures:  (unable to assess)





- HEMATOLOGICAL/ONCOLOGICAL


Hx Cancer:  (unable to assess)


Hx Human Immunodeficiency Virus (HIV):  (unable to assess)





- GENITOURINARY/GYNECOLOGICAL


Hx Sexually Transmitted Disorders:  (unable to assess)





- PSYCHIATRIC


Hx Substance Use:  (uknown)





- SURGICAL HISTORY


Hx Surgeries: No





- ANESTHESIA


Hx Anesthesia: No





Meds


Allergies/Adverse Reactions: 


 Allergies











Allergy/AdvReac Type Severity Reaction Status Date / Time


 


Unobtainable Allergy   Verified 08/24/18 23:12














- Medications


Medications: 


 Current Medications





Lorazepam (Ativan)  2 mg IM PRN PRN


   PRN Reason: Agitation


   Last Admin: 08/25/18 07:00 Dose:  2 mg











Physical Exam





- Psychiatric Exam


Psychiatric exam: Agitated, Flat Affect, Manic


Additional comments: 





alert,oriented x3 with disorganized thinking and delusional and paranoid ,

responding to hallucinations .pt has poor insight and poor judgement .no 

suicidal ideation but unable to take care of herself.





Results





- Vital Signs


Recent Vital Signs: 


 Last Vital Signs











Temp  97.9 F   08/26/18 19:45


 


Pulse  88   08/26/18 19:45


 


Resp  18   08/26/18 19:45


 


BP  118/77   08/26/18 19:45


 


Pulse Ox  99   08/26/18 19:45














- Labs


Result Diagrams: 


 08/25/18 01:10





 08/25/18 01:10





Assessment & Plan





- Assessment and Plan (Free Text)


Assessment: 





schizophrenia,paranoid type 


Plan: 





Continue 1:1 observation.


haldol and benadryl prn 


transfer pt to [facility] when bed is available

## 2018-08-26 NOTE — CARD
--------------- APPROVED REPORT --------------





Date of service: 08/25/2018



EKG Measurement

Heart [id number]

AL [id number]

[id number]

[id number]

[id number]



<Conclusion>

Normal sinus rhythm with sinus arrhythmia

early repolarization

otherwise normal ECG

## 2018-08-26 NOTE — ED PDOC
- Laboratory Results


Result Diagrams: 


 08/25/18 01:10





 08/25/18 01:10





- ECG


O2 Sat by Pulse Oximetry: 100





Disposition





- Clinical Impression


Clinical Impression: 


 Schizophrenia, Bipolar 1 disorder








- POA


Present On Arrival: None





- Disposition


Disposition: Transfer of Care


Disposition Time: 15:00


Condition: FAIR


Forms:  [facility] (English)


Patient Signed Over To: Garrett Mcdonough III

## 2018-08-26 NOTE — ED PDOC
- Laboratory Results


Result Diagrams: 


 08/25/18 01:10





 08/25/18 01:10





- ECG


O2 Sat by Pulse Oximetry: 100





Medical Decision Making


Medical Decision Making: 





received 3pm pending [facility] [place] availability.


Patient with intermittent agitation, required additional dose ativan 2mg PO for 

anxiolysis and relief of agitation. 





730p patient given haldol 5mg and benadryl 25mg IM for moderate agitation, 

pacing, and hope for improved lucidity





10pm Dr Alicea psychiatry in ED for evaluation





12MN endorse Dr Dorsey overnight pending bed avail at [facility]








Disposition





- Clinical Impression


Clinical Impression: 


 Schizophrenia, Bipolar 1 disorder








- POA


Present On Arrival: None





- Disposition


Disposition: Transfer of Care


Disposition Time: 23:50


Condition: STABLE


Forms:  [facility] (English)

## 2018-08-27 VITALS
HEART RATE: 75 BPM | TEMPERATURE: 97.9 F | DIASTOLIC BLOOD PRESSURE: 80 MMHG | RESPIRATION RATE: 16 BRPM | OXYGEN SATURATION: 100 % | SYSTOLIC BLOOD PRESSURE: 125 MMHG

## 2018-08-27 NOTE — ED PDOC
- Laboratory Results


Result Diagrams: 


 18 01:10





 18 01:10





- ECG


O2 Sat by Pulse Oximetry: 100





Medical Decision Making


Medical Decision Makin


Received transfer of care from Dr. Mcdonough. Patient awaiting transfer to [facility] 

involuntary psych unit. 





0700


Patient endorsed to Dr. Valentin pending transfer to [facility] involuntary psych unit. 





--------------------------------------------------------------------------------

----------------------------------


Scribe Attestation: 


Documented by Miles Lima, acting as a scribe for Letha Dorsey MD.





Provider Scribe Attestation:


All medical record entries made by the Scribe were at my direction and 

personally dictated by me. I have reviewed the chart and agree that the record 

accurately reflects my personal performance of the history, physical exam, 

medical decision making, and the department course for this patient. I have 

also personally directed, reviewed, and agree with the discharge instructions 

and disposition.





Disposition





- Clinical Impression


Clinical Impression: 


 Schizophrenia, Bipolar 1 disorder








- POA


Present On Arrival: None





- Disposition


Disposition: Transfer of Care


Disposition Time: 07:00


Condition: STABLE


Patient Signed Over To: Sam Valentin


Handoff Comments: pending transfer to [facility]

## 2018-08-27 NOTE — ED PDOC
- Laboratory Results


Result Diagrams: 


 08/25/18 01:10





 08/25/18 01:10





- ECG


O2 Sat by Pulse Oximetry: 100





Disposition





- Clinical Impression


Clinical Impression: 


 Schizophrenia, Bipolar 1 disorder








- POA


Present On Arrival: None





- Disposition


Disposition: Other Institution


Disposition Time: 14:34


Condition: FAIR


Forms:  [facility] (English)

## 2019-01-30 ENCOUNTER — HOSPITAL ENCOUNTER (INPATIENT)
Dept: HOSPITAL 31 - C.ER | Age: 24
LOS: 6 days | Discharge: HOME | DRG: 430 | End: 2019-02-05
Attending: PSYCHIATRY & NEUROLOGY | Admitting: PSYCHIATRY & NEUROLOGY
Payer: MEDICAID

## 2019-01-30 VITALS — OXYGEN SATURATION: 99 %

## 2019-01-30 DIAGNOSIS — R45.851: ICD-10-CM

## 2019-01-30 DIAGNOSIS — F41.9: ICD-10-CM

## 2019-01-30 DIAGNOSIS — F25.0: Primary | ICD-10-CM

## 2019-01-30 DIAGNOSIS — G47.00: ICD-10-CM

## 2019-01-30 LAB
ALBUMIN SERPL-MCNC: 3.8 G/DL (ref 3.5–5)
ALBUMIN/GLOB SERPL: 1.2 {RATIO} (ref 1–2.1)
ALT SERPL-CCNC: 18 U/L (ref 9–52)
AST SERPL-CCNC: 17 U/L (ref 14–36)
BACTERIA #/AREA URNS HPF: (no result) /[HPF]
BASOPHILS # BLD AUTO: 0 K/UL (ref 0–0.2)
BASOPHILS NFR BLD: 0.5 % (ref 0–2)
BILIRUB UR-MCNC: NEGATIVE MG/DL
BUN SERPL-MCNC: 12 MG/DL (ref 7–17)
CALCIUM SERPL-MCNC: 8.4 MG/DL (ref 8.6–10.4)
EOSINOPHIL # BLD AUTO: 0.1 K/UL (ref 0–0.7)
EOSINOPHIL NFR BLD: 1.4 % (ref 0–4)
ERYTHROCYTE [DISTWIDTH] IN BLOOD BY AUTOMATED COUNT: 12.7 % (ref 11.5–14.5)
GFR NON-AFRICAN AMERICAN: > 60
GLUCOSE UR STRIP-MCNC: NORMAL MG/DL
HGB BLD-MCNC: 11.9 G/DL (ref 11–16)
LEUKOCYTE ESTERASE UR-ACNC: (no result) LEU/UL
LYMPHOCYTES # BLD AUTO: 3 K/UL (ref 1–4.3)
LYMPHOCYTES NFR BLD AUTO: 31.6 % (ref 20–40)
MCH RBC QN AUTO: 31.7 PG (ref 27–31)
MCHC RBC AUTO-ENTMCNC: 33.7 G/DL (ref 33–37)
MCV RBC AUTO: 94 FL (ref 81–99)
MONOCYTES # BLD: 1.4 K/UL (ref 0–0.8)
MONOCYTES NFR BLD: 15.1 % (ref 0–10)
NEUTROPHILS # BLD: 4.8 K/UL (ref 1.8–7)
NEUTROPHILS NFR BLD AUTO: 51.4 % (ref 50–75)
NRBC BLD AUTO-RTO: 0.1 % (ref 0–2)
PH UR STRIP: 6 [PH] (ref 5–8)
PLATELET # BLD: 256 K/UL (ref 130–400)
PMV BLD AUTO: 7.5 FL (ref 7.2–11.7)
PROT UR STRIP-MCNC: NEGATIVE MG/DL
RBC # BLD AUTO: 3.76 MIL/UL (ref 3.8–5.2)
RBC # UR STRIP: NEGATIVE /UL
SP GR UR STRIP: 1.02 (ref 1–1.03)
SQUAMOUS EPITHIAL: 4 /HPF (ref 0–5)
UROBILINOGEN UR-MCNC: 2 MG/DL (ref 0.2–1)
WBC # BLD AUTO: 9.4 K/UL (ref 4.8–10.8)

## 2019-01-30 PROCEDURE — GZHZZZZ GROUP PSYCHOTHERAPY: ICD-10-PCS | Performed by: PSYCHIATRIC UNIT

## 2019-01-30 PROCEDURE — GZ56ZZZ INDIVIDUAL PSYCHOTHERAPY, SUPPORTIVE: ICD-10-PCS | Performed by: PSYCHIATRIC UNIT

## 2019-01-30 RX ADMIN — DIVALPROEX SODIUM SCH: 250 TABLET, DELAYED RELEASE ORAL at 18:27

## 2019-01-30 RX ADMIN — DIVALPROEX SODIUM SCH: 250 TABLET, DELAYED RELEASE ORAL at 16:16

## 2019-01-30 NOTE — RAD
HISTORY:

 screening, admission 



COMPARISON:

Chest x-ray performed 3/10/17. 



TECHNIQUE:

Chest, one view.



FINDINGS:

Examination limited by patient obliquity. 



LUNGS:

No focal consolidation.



Please note that chest x-ray has limited sensitivity for the 

detection of pulmonary masses.



PLEURA:

No significant pleural effusion identified. No definite pneumothorax .



CARDIOVASCULAR:

The cardiomediastinal silhouette appears within normal limits of 

size. No significant atherosclerotic calcification present. 



OSSEOUS STRUCTURES:

No acute osseous abnormality identified.



VISUALIZED UPPER ABDOMEN:

Unremarkable.



OTHER FINDINGS:

None.



IMPRESSION:

No focal consolidation identified.

## 2019-01-30 NOTE — C.PDOC
History Of Present Illness


23 year old female brought in via EMS after being found sleeping on the ground. 

In the ER patient reports having suicidal ideation. Denies other complaints at 

this time. Pt is uncooperative and refuses to answer anymore questions.





<Candice Seals - Last Filed: 01/30/19 06:20>





<Aye Amador - Last Filed: 01/30/19 05:02>


History Per: Patient


History/Exam Limitations: other (uncooperative)


Onset/Duration Of Symptoms: Hrs


Current Symptoms Are (Timing): Still Present


Suicide/Self Injury Attempted (Context): None


Modifying Factor(s): Other (unknown)


Associated Symptoms: Suicidal Thoughts


Recent travel outside of the [place]: No





<Candice Seals - Last Filed: 01/30/19 06:20>


Time Seen by Provider: 01/30/19 01:58


Chief Complaint (Nursing): Psychiatric Evaluation





Past Medical History


Vital Signs: 





                                Last Vital Signs











Temp  98.1 F   01/30/19 04:25


 


Pulse  63   01/30/19 04:25


 


Resp  18   01/30/19 04:25


 


BP  100/75   01/30/19 04:25


 


Pulse Ox  98   01/30/19 04:25














- [facility] Procedures











FAMILY PSYCHOTHERAPY (03/10/17)


GROUP PSYCHOTHERAPY (02/20/18)


INDIVIDUAL PSYCHOTHERAPY, COGNITIVE-BEHAVIORAL (02/20/18)


INDIVIDUAL PSYCHOTHERAPY, SUPPORTIVE (02/20/18)


MEDICATION MANAGEMENT (03/10/17)


PSYCHIA INTERV/EVAL NEC (06/16/15)











<Aye Amador - Last Filed: 01/30/19 05:02>


Reviewed: Historical Data, Nursing Documentation, Vital Signs


Vital Signs: 





                                Last Vital Signs











Temp  98.3 F   01/30/19 01:30


 


Pulse  83   01/30/19 01:30


 


Resp  20   01/30/19 01:30


 


BP  101/63   01/30/19 01:30


 


Pulse Ox  99   01/30/19 01:30














- Medical History


PMH: Anxiety, Bipolar Disorder, Depression, Schizophrenia


   Denies: Diabetes, Hepatitis, HIV, HTN, Chronic Kidney Disease, Seizures, 

Sexually Transmitted Disease





- [facility] Procedures











FAMILY PSYCHOTHERAPY (03/10/17)


GROUP PSYCHOTHERAPY (02/20/18)


INDIVIDUAL PSYCHOTHERAPY, COGNITIVE-BEHAVIORAL (02/20/18)


INDIVIDUAL PSYCHOTHERAPY, SUPPORTIVE (02/20/18)


MEDICATION MANAGEMENT (03/10/17)


PSYCHIA INTERV/EVAL NEC (06/16/15)








Family History: States: Unknown Family Hx





- Social History


Hx Tobacco Use: No


Hx Alcohol Use: No


Hx Substance Use: No





- Immunization History


Hx Tetanus Toxoid Vaccination: No


Hx Influenza Vaccination: No


Hx Pneumococcal Vaccination: No





<Candice Seals - Last Filed: 01/30/19 06:20>





Review Of Systems


Review Of Systems: ROS cannot be obtained secondary to pt's inabilty to answer 

questions. (uncooperative)


Psych: Positive for: Suicidal ideation





<Candice Seals - Last Filed: 01/30/19 06:20>





Physical Exam





- Physical Exam


Appears: Non-toxic


Skin: Normal Color, Warm, Dry


Head: Atraumatic, Normacephalic


Eye(s): bilateral: Normal Inspection


Oral Mucosa: Moist


Chest: Symmetrical, No Tenderness


Cardiovascular: Rhythm Regular


Respiratory: Normal Breath Sounds, No Rales, No Rhonchi, No Wheezing


Gastrointestinal/Abdominal: Soft, No Tenderness


Neurological/Psych: Normal Speech, Other (alert and responsive )





<Candice Seals - Last Filed: 01/30/19 06:20>





ED Course And Treatment





- Laboratory Results


Result Diagrams: 


                                 01/30/19 02:40





                                 01/30/19 02:40


Lab Results: 





                                        











Total Bilirubin  0.4 mg/dL (0.2-1.3)   01/30/19  02:40    


 


AST  17 U/L (14-36)   01/30/19  02:40    


 


ALT  18 U/L (9-52)   01/30/19  02:40    


 


Alkaline Phosphatase  54 U/L ()   01/30/19  02:40    


 


Total Protein  6.8 g/dL (6.3-8.3)   01/30/19  02:40    


 


Albumin  3.8 g/dL (3.5-5.0)   01/30/19  02:40    


 


Globulin  3.0 gm/dL (2.2-3.9)   01/30/19  02:40    


 


Albumin/Globulin Ratio  1.2  (1.0-2.1)   01/30/19  02:40    








                                        











Urine Color  Yellow  (YELLOW)   01/30/19  03:26    


 


Urine Clarity  Clear  (Clear)   01/30/19  03:26    


 


Urine pH  6.0  (5.0-8.0)   01/30/19  03:26    


 


Ur Specific Gravity  1.024  (1.003-1.030)   01/30/19  03:26    


 


Urine Protein  Negative mg/dL (NEGATIVE)   01/30/19  03:26    


 


Urine Glucose (UA)  Normal mg/dL (Normal)   01/30/19  03:26    


 


Urine Ketones  Negative mg/dL (NEGATIVE)   01/30/19  03:26    


 


Urine Blood  Negative  (NEGATIVE)   01/30/19  03:26    


 


Urine Nitrate  Negative  (NEGATIVE)   01/30/19  03:26    


 


Urine Bilirubin  Negative  (NEGATIVE)   01/30/19  03:26    


 


Urine Urobilinogen  2.0 mg/dL (0.2-1.0)  H  01/30/19  03:26    


 


Ur Leukocyte Esterase  Trace Gwen/uL (Negative)   01/30/19  03:26    


 


Urine WBC (Auto)  5 /hpf (0-5)   01/30/19  03:26    


 


Urine RBC (Auto)  4 /hpf (0-3)  H  01/30/19  03:26    


 


Ur Squamous Epith Cells  4 /hpf (0-5)   01/30/19  03:26    


 


Urine Bacteria  Rare  (<OCC)   01/30/19  03:26    


 


Urine HCG, Qual  Negative  (NEGATIVE)   01/30/19  03:26    








                                        











Urine HCG, Qual  Negative  (NEGATIVE)   01/30/19  03:26    











ECG: Interpreted By Me, Viewed By Me


ECG Rhythm: Sinus Rhythm (64), Nonspecific Changes


Pulse Ox Interpretation: Normal





- Radiology


CXR: Interpreted by Me, Viewed By Me


CXR Interpretation: No: Infiltrates, Fracture, Pnemothorax





<Aye Amador - Last Filed: 01/30/19 05:02>





- Laboratory Results


Result Diagrams: 


                                 01/30/19 02:40





                                 01/30/19 02:40


Lab Results: 





                                        











Total Bilirubin  0.4 mg/dL (0.2-1.3)   01/30/19  02:40    


 


AST  17 U/L (14-36)   01/30/19  02:40    


 


ALT  18 U/L (9-52)   01/30/19  02:40    


 


Alkaline Phosphatase  54 U/L ()   01/30/19  02:40    


 


Total Protein  6.8 g/dL (6.3-8.3)   01/30/19  02:40    


 


Albumin  3.8 g/dL (3.5-5.0)   01/30/19  02:40    


 


Globulin  3.0 gm/dL (2.2-3.9)   01/30/19  02:40    


 


Albumin/Globulin Ratio  1.2  (1.0-2.1)   01/30/19  02:40    








                                        











Urine Color  Yellow  (YELLOW)   01/30/19  03:26    


 


Urine Clarity  Clear  (Clear)   01/30/19  03:26    


 


Urine pH  6.0  (5.0-8.0)   01/30/19  03:26    


 


Ur Specific Gravity  1.024  (1.003-1.030)   01/30/19  03:26    


 


Urine Protein  Negative mg/dL (NEGATIVE)   01/30/19  03:26    


 


Urine Glucose (UA)  Normal mg/dL (Normal)   01/30/19  03:26    


 


Urine Ketones  Negative mg/dL (NEGATIVE)   01/30/19  03:26    


 


Urine Blood  Negative  (NEGATIVE)   01/30/19  03:26    


 


Urine Nitrate  Negative  (NEGATIVE)   01/30/19  03:26    


 


Urine Bilirubin  Negative  (NEGATIVE)   01/30/19  03:26    


 


Urine Urobilinogen  2.0 mg/dL (0.2-1.0)  H  01/30/19  03:26    


 


Ur Leukocyte Esterase  Trace Gwen/uL (Negative)   01/30/19  03:26    


 


Urine WBC (Auto)  5 /hpf (0-5)   01/30/19  03:26    


 


Urine RBC (Auto)  4 /hpf (0-3)  H  01/30/19  03:26    


 


Ur Squamous Epith Cells  4 /hpf (0-5)   01/30/19  03:26    


 


Urine Bacteria  Rare  (<OCC)   01/30/19  03:26    


 


Urine HCG, Qual  Negative  (NEGATIVE)   01/30/19  03:26    








                                        











Urine HCG, Qual  Negative  (NEGATIVE)   01/30/19  03:26    











O2 Sat by Pulse Oximetry: 99 (Room air)


Pulse Ox Interpretation: Normal


Progress Note: Blood work and urinalysis ordered. Crisis notified. Patient 

medically cleared for psych evaluation.





<Candice Seals - Last Filed: 01/30/19 06:20>





Disposition





<Aye Amador - Last Filed: 01/30/19 05:02>





- Disposition


Disposition Time: 07:00





<Candice Seals Last Filed: 01/30/19 06:20>





- Disposition


Condition: STABLE


Forms:  [facility] (English)





- Clinical Impression


Clinical Impression: 


 Suicidal ideation








- PA / NP / Resident Statement


MD/DO has reviewed & agrees with the documentation as recorded.





- Scribe Statement


The provider has reviewed the documentation as recorded by the Scribe





Martell Carnes





All medical record entries made by the Scribe were at my direction and 

personally dictated by me. I have reviewed the chart and agree that the record 

accurately reflects my personal performance of the history, physical exam, 

medical decision making, and the department course for this patient. I have also

personally directed, reviewed, and agree with the discharge instructions and 

disposition.





<Candice Seals - Last Filed: 01/30/19 06:20>





Physician Patient Turnover


Patient Signed Over To: Catherine Irwin


Handoff Comments: Pending evaluation by [facility] screener





<Candice Seals - Last Filed: 01/30/19 06:20>

## 2019-01-30 NOTE — PCM.BM
<Roberto Carlos Becker - Last Filed: 01/30/19 23:28>





Treatment Plan Problems





- Problems identified on initial assessmt


  ** Altered thought process


Date Initiated: 01/30/19


Time Initiated: 15:15


Assessment reference: NA


Status: Active





  ** Suicidal Ideation


Date Initiated: 01/30/19


Time Initiated: 15:15


Assessment reference: NA


Status: Active





Treatment assets and liabiliti


Patient Assests: negotiates basic needs


Patient Liabilities: poor support system, relationship conflicts





- Milieu Protocol


Maintain good personal hygiene: daily Encourage regular showers, daily Remind 

patient to perform daily oral care, every shift Assist patient to perform ADL's


Conduct patient checks and document Observation sheet: Q15 minutes


Maintain personal safety: every shift Educate patient to report safety concerns 

to staff, every shift Monitor environment for contraband/sharps


Medication safety: Monitor for expected outcome, potential side effects: every 

shift, Assess barriers to learning: every shift, Assess readiness for medication

education: every shift





<Reyna Cline - Last Filed: 02/01/19 11:34>





Family Contact


Family involvement: Family/SO is involved


Family contact: Patient agrees to contact





- Goals for Treatment


Patient goals for treatment: "I don't know."





Discharge/Continuing Care





- Education Needs


Education Needs: Patient Medication, Patient Coping Skills





- Discharge


Discharge Criteria: Tolerates medication w/o severe side effects, Reduction of 

target symptoms


Discharge to:: Home, With Family





- Treatment Team Participation


Discussed with Family/SO: No


Was Patient/Family/SO present at Treatment Team Meeting: Yes

## 2019-01-31 RX ADMIN — DIVALPROEX SODIUM SCH MG: 250 TABLET, DELAYED RELEASE ORAL at 10:17

## 2019-01-31 RX ADMIN — DIVALPROEX SODIUM SCH MG: 250 TABLET, DELAYED RELEASE ORAL at 02:16

## 2019-01-31 RX ADMIN — DIVALPROEX SODIUM SCH MG: 500 TABLET, DELAYED RELEASE ORAL at 17:52

## 2019-01-31 NOTE — PCM.PSYCH
Initial Psychiatric Evaluation





- Initial Psychiatric Evaluation


Type of Admission: Voluntary


Legal Status: Capacity


Chief Complaint (in patient's own words): 





I am hearing voices


History of Present Illness and Precipitating Events: 





Patient is a 23 years old to the ED by the [facility] police because of 

increasingly depressed mood, disorganized behavior and suicidal ideation.





Writer is familiar with this patient.  Patient has been admitted multiple times 

at [facility] and [facility] because of disorganized behavior and 

exacerbation of schizophrenia.  As per the ED note, [place] questions. Pt reports that she wants

to kill herself.  





Patient remained disorganized and internally preoccupied throughout the 

interview.  She appears very paranoid and suspicious and delusional.  Patient 

reports that she became increasingly depressed as soon as she stopped taking her

medications.  She reports that she started hearing voices telling her to kill 

herself. 





Staff reports that patient remained increasingly irritable, agitated, hostile 

and oppositional.  She remained disorganized and continued to have loose 

associations throughout the interview. Pt appears acutely regressed, lying in 

fetal position on the bed.  She also reports visual hallucinations seeing 

shadows and paranoia that people are following her.  However she denies any 

drinking or any drugs.





Past medical history


None reported


Current Medications: 





Active Medications











Generic Name Dose Route Start Last Admin





  Trade Name Freq  PRN Reason Stop Dose Admin


 


Benztropine Mesylate  1 mg  01/30/19 18:00  01/30/19 20:45





  Cogentin  PO   1 mg





  BID PASQUALE   Administration





     





     





     





     


 


Benztropine Mesylate  2 mg  01/30/19 15:32  01/31/19 02:16





  Cogentin  PO   2 mg





  Q6 PRN   Administration





  Extra Pyramidal Symptoms   





     





     





     


 


Clonazepam  1 mg  01/30/19 18:00  01/30/19 18:27





  Klonopin  PO   Not Given





  TID PASQUALE   





     





     





     





     


 


Divalproex Sodium  250 mg  01/30/19 16:30  01/31/19 02:16





  Depakote Dr  PO   250 mg





  TID PASQUALE   Administration





     





     





     





     


 


Docusate Sodium  100 mg  01/30/19 15:31  





  Colace  PO   





  TID PRN   





  Constipation   





     





     





     


 


Famotidine  20 mg  01/31/19 10:00  





  Pepcid  PO   





  DAILY PASQUALE   





     





     





     





     


 


Haloperidol  5 mg  01/30/19 18:00  01/30/19 20:45





  Haldol  PO   5 mg





  BID PASQUALE   Administration





     





     





     





     


 


Haloperidol  5 mg  01/30/19 15:32  01/31/19 08:25





  Haldol  PO   5 mg





  Q8 PRN   Administration





  Moderate Agitation   





     





     





     


 


Haloperidol Lactate  5 mg  01/30/19 15:32  





  Haldol  IM   





  Q8 PRN   





  Moderate Agitation    





     





     





     


 


Hydroxyzine HCl  25 mg  01/30/19 15:32  





  Atarax  PO   





  Q6 PRN   





  Anxiety   





     





     





     


 


Lorazepam  2 mg  01/30/19 15:32  01/31/19 02:16





  Ativan  PO   1 mg





  Q8H PRN   Administration





  Severe Agitation   





     





     





     


 


Pneumococcal Polyvalent Vaccine  0.5 ml  02/02/19 10:00  





  Pneumovax 23 Vaccine  IM  02/02/19 10:01  





  .ONCE ONE   





     





     





     





     


 


Trazodone HCl  50 mg  01/30/19 15:31  01/30/19 20:45





  Desyrel  PO   50 mg





  HS PRN   Administration





  Insomnia   





     





     





     














Past Psychiatric History





- Past Psychiatric History


Previous Treatment History: Inpatient


Pertinent Medical Hx (Current Medical&Sleep Prob, Allergies): 





                                    Allergies











Allergy/AdvReac Type Severity Reaction Status Date / Time


 


No Known Allergies Allergy   Verified 01/30/19 01:57








                                        





Docusate [Colace] 100 mg PO TID PRN #30 cap 02/17/18 


Famotidine [Pepcid] 20 mg PO DAILY #20 tab 02/17/18 


Benztropine [Cogentin] 1 mg PO BID #60 tab 03/01/18 


Haloperidol [Haldol] 10 mg PO BID #60 tab 03/01/18 


Sertraline [Zoloft] 100 mg PO DAILY #30 tab 03/01/18 


traZODone [Desyrel] 50 mg PO HS PRN #30 tab 03/01/18 











Review of Systems





- Review of Systems


All systems: reviewed and no additional remarkable complaints except





- Psychiatric


Psychiatric: Anxiety, Auditory Hallucinations, Irritability, Mood Swings, 

Paranoia, Suicidal Ideation





Mental Status Examination





- Personal Presentation


Personal Presentation: Looks stated age





- Affect


Affect: Broad





- Motor Activity


Motor Activity: Psychomotor Agitation





- Reliability in Providing Information


Reliability in Providing Information: Poor, due to alteration in thoughts, Poor,

 due to altered mood





- Speech


Speech: Disorganized





- Mood


Mood: Depressed, Anxious





- Formal Thought Process


Formal Thought Process: Hallucinations, Delusions, Paranoia, Loosening of 

associations





- Hallucinations/Delusions


Hallucinations: Visual, Auditory


Delusions: Persecution





- Obsessions/Compulsions


Obsessions: No


Compulsions: No





- Cognitive Functions


Orientation: Person, Place, Situation, Time


Attention/Concentration: Attentive


Abstract Thinking: [place]


Estimate of Intelligence: Below average


Judgement: Imparied, as evidence by: Poor judgement, Imparied, as evidence by: 

Lack of insight into illness





- Risk


Risk: Suicidal, Diminished functioning





- Limitations


Limitations: Living alone





DSM 5 DX





- DSM 5


DSM 5 Diagnosis: 





Schizoaffective disorder bipolar type








- Recommended/Plan of Treatment


Treatment Recommendations and Plan of Treatment: 








Schizoaffective disorder bipolar type








Psychoeducation


Supportive therapy and group therapy


Depakote for mood stabilization


Klonopin for anxiety


Haldol psychosis


Risperdal psychosis


Invega Sussten 235 mg IM q monthly


Trazodone for insomnia














- Smoking Cessation


Smoking Cessation Initiated: No

## 2019-02-01 RX ADMIN — DIVALPROEX SODIUM SCH MG: 500 TABLET, DELAYED RELEASE ORAL at 10:05

## 2019-02-01 RX ADMIN — DIVALPROEX SODIUM SCH MG: 500 TABLET, DELAYED RELEASE ORAL at 17:24

## 2019-02-01 NOTE — PCM.PYCHPN
Psychiatric Progress Note





- Psychiatric Progress Note


Patient seen today, length of contact: 15 min


Patient Chief Complaint: 





I am hearing voices


Problems Identified/Issues Discussed: 





She was seen and evaluated, chart reviewed and discussed with the staff.


She still appears disorganized and internally preoccupied.  


She remained disheveled and unkempt and at times found responding to internal 

stimuli.


She appears at times paranoid and delusional.  As per staff she is still pacing 

back and forth in the hallways and not communicating to anyone.


However she is taking her medications and denies any side effects.


Supportive therapy was given.





Medication Change: Yes


Medical Record Reviewed: Yes





Mental Status Examination





- Cognitive Function


Orientation: Person, Place, Situation, Time


Memory: Intact


Attention: Poor


Concentration: Poor


Association: Loose


Fund of Knowledge: Poor





- Mood


Mood: Depressed, Anxious





- Affect


Affect: Broad





- Speech


Speech: Soft





- Formal Thought Process


Formal Thought Process: Hallucinations, Delusions, Paranoia, Loosening of 

associations





- Suicidal Ideation


Suicidal Ideation: No





- Homicidal Ideation


Homicidal Ideation: No





Goal/Treatment Plan





- Goal/Treatment Plan


Need for Continued Stay: Remain at risks for inpatient hospitalization, Severe 

depression anxiety


Progress Toward Problem(s) and Goals/Treatment Plan: 








Schizoaffective disorder bipolar type








Psychoeducation


Supportive therapy and group therapy


Depakote for mood stabilization


Klonopin for anxiety


DC Haldol psychosis


Risperdal psychosis


Invega Sussten 235 mg IM q monthly


Trazodone for insomnia


Start Risperdal for psychosis

## 2019-02-02 RX ADMIN — DIVALPROEX SODIUM SCH MG: 500 TABLET, DELAYED RELEASE ORAL at 17:38

## 2019-02-02 RX ADMIN — DIVALPROEX SODIUM SCH MG: 500 TABLET, DELAYED RELEASE ORAL at 10:01

## 2019-02-02 NOTE — PCM.PYCHPN
Psychiatric Progress Note





- Psychiatric Progress Note


Patient seen today, length of contact: 15 min


Patient Chief Complaint: 





I am feeling better.


Patient was evaluated using translation services.  Intubated #[id number].


Problems Identified/Issues Discussed: 





Patient seen, chart reviewed, case discussed with the staff.





Issues related to illness and treatment were discussed with the patient and 

staff.





Reported compliant with treatment with no adverse effects.





Tolerating treatment very well.





Reported feeling better with the treatment.





Mood reported as okay.





Affect inappropriate, appeared Flat.





Calm and cooperative.





Awake, alert and oriented x3.





Aftercare discussed with the patient.





Denied any delusions, auditory or visual hallucinations, suicidal ideations or 

homicidal ideations at the time of evaluation.


Medical Problems: 





None reported


Diagnostic Results: 





Reviewed


DSM 5 Symptoms Update: 





Some improvement with treatment.


Medication Change: No


Medical Record Reviewed: Yes





Mental Status Examination





- Cognitive Function


Orientation: Person, Place, Situation, Time


Memory: Intact


Attention: WNL


Concentration: WNL


Association: WNL


Fund of Knowledge: WNL


Decription of patient's judgement and insights: 





Fair





- Mood


Mood: Neutral





- Affect


Affect: Flat





- Speech


Speech: Soft





- Formal Thought Process


Formal Thought Process: Loosening of associations





- Suicidal Ideation


Suicidal Ideation: No





- Homicidal Ideation


Homicidal Ideation: No





Goal/Treatment Plan





- Goal/Treatment Plan


Need for Continued Stay: Remain at risks for inpatient hospitalization, 

Discharge may exacerbated symptoms, Severe functional impairment


Progress Toward Problem(s) and Goals/Treatment Plan: 





Some improvement with treatment.


Patient education.


Supportive therapy.


Continue treatment as before.


Patient will go to [facility] for follow-up care after 

discharge from the hospital.


Estimated Date of D/C: 02/06/19





- Smoking Cessation


Smoking Cessation Initiated: No

## 2019-02-03 RX ADMIN — DIVALPROEX SODIUM SCH MG: 500 TABLET, DELAYED RELEASE ORAL at 09:31

## 2019-02-03 RX ADMIN — DIVALPROEX SODIUM SCH MG: 500 TABLET, DELAYED RELEASE ORAL at 17:28

## 2019-02-04 RX ADMIN — DIVALPROEX SODIUM SCH MG: 500 TABLET, DELAYED RELEASE ORAL at 17:16

## 2019-02-04 RX ADMIN — DIVALPROEX SODIUM SCH MG: 500 TABLET, DELAYED RELEASE ORAL at 09:40

## 2019-02-04 NOTE — CARD
--------------- APPROVED REPORT --------------





Date of service: 01/30/2019



EKG Measurement

Heart [id number]

[id number]

[id number]

[id number]

[id number]



<Conclusion>

Normal sinus rhythm with sinus arrhythmia

Early repolarization

Normal ECG

## 2019-02-05 VITALS
TEMPERATURE: 98 F | DIASTOLIC BLOOD PRESSURE: 67 MMHG | HEART RATE: 82 BPM | SYSTOLIC BLOOD PRESSURE: 113 MMHG | RESPIRATION RATE: 20 BRPM

## 2019-02-05 RX ADMIN — DIVALPROEX SODIUM SCH MG: 500 TABLET, DELAYED RELEASE ORAL at 09:50

## 2019-02-05 NOTE — PCM.PYCHDC
Mental Status Examination





- Mental Status Examination


Orientation: Person, Place, Situation, Time


Memory: Intact


Mood: Neutral


Affect: Constricted


Speech: Soft


Attention: WNL


Concentration: WNL


Association: WNL


Fund of Knowledge: WNL


Formal Thought Process: No Impairment


Description of patient's judgement and insight: 





good, fair


Psychotic Thoughts and Behaviors: 





denies any AVH


Suicidal Ideation: No


Current Homicidal Ideation?: No





Discharge Summary





- Discharge Note


Reason for Hospitalization: 








Patient is a 23 years old to the ED by the [facility] police because of 

increasingly depressed mood, disorganized behavior and suicidal ideation.





Writer is familiar with this patient.  Patient has been admitted multiple times 

at [facility] because of disorganized behavior and 

exacerbation of schizophrenia.  As per the ED note, [place] 

[place] and refusing to answer questions. Pt reports that she wants

to kill herself.  





Patient remained disorganized and internally preoccupied throughout the 

interview.  She appears very paranoid and suspicious and delusional.  Patient 

reports that she became increasingly depressed as soon as she stopped taking her

medications.  She reports that she started hearing voices telling her to kill 

herself. 





Staff reports that patient remained increasingly irritable, agitated, hostile 

and oppositional.  She remained disorganized and continued to have loose 

associations throughout the interview. Pt appears acutely regressed, lying in 

fetal position on the bed.  She also reports visual hallucinations seeing 

shadows and paranoia that people are following her.  However she denies any 

drinking or any drugs.








Consultations:: List each consultation separately and include:  1. Reason for 

request.  2. Findings.  3. Follow-up


Summary of Hospital Course include:: 1. Description of specific treatment plan 

utilized for patients during their course of treatmen.  2. Summarize the time-

course for resolution of acute symptoms and/or regressed behaviors.  3. Describe

issues identified and worked on during hospitalization.  4. Describe medication 

utilized.  5. Describe medical problems identified and treated.  6. Reassessment

of suicide risk


Summary of Hospital Course: 


During the course of her stay, patient (pt) started progressively improving and 

she no longer remained irritable, anxious and paranoid. Her mood and paranoia 

were improved and she started attending groups and meetings and started 

socializing. 





Patient denied any feelings of hopelessness, helplessness, and worthlessness, 

denied any problem with the sleep or appetite, denied suicidal ideation or 

homicidal ideation. Pt denied any auditory or visual hallucinations.  





Some changes were made in her current medications and patient was discharged on 

following medications. She tolerated these medications very well and denied any 

side effects. 








- Final Diagnosis (DSM 5)


Condition upon Discharge: STABLE


DSM 5: 





Schizoaffective disorder bipolar type








Disposition: HOME/ ROUTINE


Follow-up Treatment Plan: 





F/U: 


Pt is to return to [facility] outpatient program. Pt will get Invega Susstena shots 

there.








Education:


Pt was educated and counseled about the risks and benefits of taking and not 

taking medications.  





Pt was educated and counseled about the risks of drinking and abusing drugs.   





Pt was educated and counseled to go to the ER or call 911 if pt develop suicidal

ideation or homicidal ideation, worsening of symptoms or severe side effects of 

the meds.


   





Prescriptions/Medication Reconciliation: 


Benztropine [Cogentin] 1 mg PO BID #60 tab


Divalproex [Depakote DR] 500 mg PO BID #60 tcp


Paliperidone Palmitate [Invega Sustenna] 234 mg IM ONCE #1 syr


PARoxetine [Paxil] 30 mg PO DAILY #30 tab


risperiDONE [RisperDAL Tab] 2 mg PO BID #60 tab


risperiDONE [RisperDAL Tab] 2 mg PO HS #30 tab


traZODone [Desyrel] 50 mg PO HS PRN #30 tab


 PRN Reason: Insomnia





- Smoking Cessation


Smoking Cessation Medication prescribed: No





- Antipsychotic Medications


Pt discharged on 2 or more routine antipsychotic medications: No

## 2021-10-07 NOTE — C.PDOC
History Of Present Illness





<Carmelita White - Last Filed: 03/10/17 16:39>





<VegaTiffany mitchell - Last Filed: 03/10/17 18:50>





<Aye Amador - Last Filed: 03/11/17 00:28>


22 y/o female is brought in to the ED by EMS who report that mother called EMS 

stating that patient is non compliant with medications. Mother reports history 

of depression, bipolar disorder, and schizophrenia. Patient not cooperative 

with EMS or ED staff. Refusing to open her eyes or answer question. EMS noted 

that the patient was tearful at one point.


 (Tiffany Vega)








<Carmelita White - Last Filed: 03/10/17 16:39>


History Per: EMS, Family (mother)


History/Exam Limitations: other (pt not cooperating)


Onset/Duration Of Symptoms: Unknown


Current Symptoms Are (Timing): Still Present


Suicide/Self Injury Attempted (Context): None


Modifying Factor(s): Other ([facility])


Involuntary Hold By: None


Recent travel outside of the [place]: No





<GaryTiffany AGATHA - Last Filed: 03/10/17 18:50>





<MarjorieElyjoo - Last Filed: 03/11/17 00:28>


Time Seen by Provider: 03/10/17 16:03


Chief Complaint (Nursing): Psychiatric Evaluation





Past Medical History


Reviewed: Historical Data, Nursing Documentation, Vital Signs





- Medical History


PMH: Bipolar Disorder, Depression, Schizophrenia


Surgical History: No Surg Hx


Family History: States: Unknown Family Hx





- Social History


Hx Tobacco Use: No


Hx Alcohol Use: No


Hx Substance Use: No





- Immunization History


Hx Tetanus Toxoid Vaccination: No


Hx Influenza Vaccination: No


Hx Pneumococcal Vaccination: No





<GayrKristopherTiffany L - Last Filed: 03/10/17 18:50>


Vital Signs: 


 Last Vital Signs











Temp  98.3 F   03/10/17 23:15


 


Pulse  51 L  03/10/17 23:15


 


Resp  14   03/10/17 23:15


 


BP  111/71   03/10/17 23:15


 


Pulse Ox  99   03/10/17 23:15

















- [facility] Procedures








PSYCHIA INTERV/EVAL NEC (06/16/15)














Review Of Systems


Review Of Systems: ROS cannot be obtained secondary to pt's inabilty to answer 

questions. (pt uncooperative)





<VegaTiffany AGATHA - Last Filed: 03/10/17 18:50>





Physical Exam





- Physical Exam


Appears: Non-toxic, No Acute Distress


Skin: Normal Color, Warm, Dry


Head: Atraumatic, Normacephalic


Eye(s): bilateral: PERRL, Other (pt refusing to open eyes)


Neck: Supple


Chest: Symmetrical


Cardiovascular: Rhythm Regular


Respiratory: Normal Breath Sounds, No Rales, No Rhonchi, No Wheezing


Pulses: Left Radial: Normal, Right Radial: Normal


Neurological/Psych: Other (pt nonverbal, refusing to answer question or open 

her eyes, nonresponsive to verbal  stimuli)


Pain Response: Withdraws With Pain





<VegaTiffany AGATHA - Last Filed: 03/10/17 18:50>





ED Course And Treatment





- Laboratory Results


Result Diagrams: 


 03/10/17 17:15





 03/10/17 17:15


Lab Interpretation: No Acute Changes


O2 Sat by Pulse Oximetry: 100 (ra)


Pulse Ox Interpretation: Normal





<VegaTiffany AGATHA - Last Filed: 03/10/17 18:50>





- Laboratory Results


Result Diagrams: 


 03/10/17 17:15





 03/10/17 17:15


O2 Sat by Pulse Oximetry: 100


Pulse Ox Interpretation: Normal





- Radiology


CXR: Interpreted by Me, Viewed By Me


CXR Interpretation: No: Infiltrates, Fracture, Pnemothorax





<Aye Amador - Last Filed: 03/11/17 00:28>





Supervising Attending Note





- Supervising Attending Note


The Documented history was done by the: Physician Extender


The documented physical exam was done by the: Physician Extender


The documented procedures were done by the: Physician Extender


EM CAVEAT: Acuity of Condition, Uncooperative, Language Barrier





- Attestation:


I have personally seen and examined this patient.: Yes


I have fully participated in the care of the patient.: Yes


I have reviewed all pertinent clinical information, including history, physical 

exam and plan: Yes





<Carmelita White - Last Filed: 03/10/17 16:39>





<GaryTiffany L - Last Filed: 03/10/17 18:50>





<Aye Amador - Last Filed: 03/11/17 00:28>





- Notes:


Notes:: 


LIMITED DUE TO UNCOOP, POOR HISTORIAN. FAMILY STATES PT ACTING BIZARRE, 

NONCOMPLIANT W MEDS UNK DURATION. EXAM AS ABOVE. PT FOCAL WITHDRAWAL, ACTIVE 

FIGHTING AWAY NOXIOUS STIM, ACTIVE RESISTANCE TO EYE EXAM, NONVERBAL. VSS.  (Carmelita White)








Medical Decision Making





<Carmelita White - Last Filed: 03/10/17 16:39>





<Tiffany Vega - Last Filed: 03/10/17 18:50>





<Aye Amador - Last Filed: 03/11/17 00:28>


Medical Decision Making: 


Impression: as per EMS for evaluation per mother's request. Hx bipolar, 

depression, and schizophrenia. Pt uncooperative, refusing to answer questions 

or open her eyes.





Plan:


* Blood Work


* Urinalysis


* Urine HCG





Progress:


Patient not cooperative. Call for crisis worker and Dr White also examines patient 

at bedside. Orders placed for medical clearance. 


Crisis worker Tess reports that she got in contact with the patient's sister. 

Sister reports that EMS was called because the patient was banging her head 

against the wall. She denies any verbalization of suicidal ideation. Sister 

notes that the patient had been living with her uncle for 4 months and showed 

up at their mother's house today. She states that the patient is generally non 

compliant with her treatment. Sister states that when non compliant patient 

paces and is verbally and physically aggressive, though she has not been known 

to physically hurt anyone. Sister notes that after EMS was called patient 

became uncooperative, refusing to respond. Patient remains as such in the ED.


Labs reviewed by me and no acute findings. As per crisis worker, patient still 

refusing to answer questions, will have to call for [facility] screeners


Patient care signed out to Dr Amador (Tiffany Vega)








ED OBSERVATION





<Carmelita White - Last Filed: 03/10/17 16:39>


Date of observation admission: 03/10/17


Time of observation admission: 17:00





<Tiffany Vega - Last Filed: 03/10/17 18:50>





<Aye Amador - Last Filed: 03/11/17 00:28>





- Observation admission statement


Patient is being placed in observation because:: 


Schizophrenia (Tiffany Vega)








- Goals of Observation


Goals of observation are:: 


Psych Screener (Tiffany Vega)








- Progress Note


Progress Note: 


03/10/17 19:00


Patient care signed over to Dr Amador pending screener from [facility]





 (Tiffany Vega)





03/10/17 23:08


still awaiting [facility] screener. Pt without complaints, Vitals stable (Aye Amador

)








Disposition





<Carmelita White - Last Filed: 03/10/17 16:39>





<Tiffany Vega - Last Filed: 03/10/17 18:50>


Discussed With Dr.: Nicole Arroyo


Comment: accepted the pt onhis service and took over the care at 12:27AM


Doctor Will See Patient In The: Hospital


Counseled Patient/Family Regarding: Studies Performed, Diagnosis





- Disposition


Disposition Time: 19:00





<Aye Amador - Last Filed: 03/11/17 00:28>





- Disposition


Disposition: HOSPITALIZED


Condition: FAIR





- Clinical Impression


Clinical Impression: 


 Schizophrenia





<Carmelita White - Last Filed: 03/10/17 16:39>





- PA / NP / Resident Statement


MD/DO has reviewed & agrees with the documentation as recorded.





- Scribe Statement


The provider has reviewed the documentation as recorded by the Scribe (Jessica Martinez)





<Tiffany Vega - Last Filed: 03/10/17 18:50>





<Aye Amador - Last Filed: 03/11/17 00:28>





- Scribe Statement





All medical record entries made by the Scribe were at my direction and 

personally dictated by me. I have reviewed the chart and agree that the record 

accurately reflects my personal performance of the history, physical exam, 

medical decision making, and the department course for this patient. I have 

also personally directed, reviewed, and agree with the discharge instructions 

and disposition. (Tiffany Vega)








Decision To Admit





<Tiffany Vega - Last Filed: 03/10/17 18:50>





- Pt Status Changed To:


Hospital Disposition Of: Inpatient





- Admit Certification


Admit to Inpatient:: After my assessment, the patient will require 

hospitalization for at least two midnights.  This is because of the severity of 

symptoms shown, intensity of services needed, and/or the medical risk in this 

patient being treated as an outpatient.





- InPatient:


Physician Admission Certification: I certify that this patient requires 2 or 

more midnights of care for the following reason:: After my assessment, the 

patient will require hospitalization for at least two midnights.  This is 

because of the severity of symptoms shown, intensity of services needed, and/or 

the medical risk in this patient being treated as an outpatient.





- .


Bed Request Type: Psychiatry


Admitting Physician: Nicole Arroyo





<Aye Amador - Last Filed: 03/11/17 00:28>





- .


Patient Diagnosis: 


 Schizophrenia No

## 2023-04-03 NOTE — C.PDOC
History Of Present Illness


22 yr old female transferred to ER from [facility] for psych admission. 

Patient has history of schizophrenia disorder. Denies any phsyical complaints, 

SI or HI. 


Time Seen by Provider: 02/20/18 18:50


Chief Complaint (Nursing): Psychiatric Evaluation


History Per: Patient, Other


History/Exam Limitations: no limitations


Suicide/Self Injury Attempted (Context): None





Past Medical History


Reviewed: Historical Data, Nursing Documentation, Vital Signs


Vital Signs: 


 Last Vital Signs











Temp  98.1 F   02/20/18 19:14


 


Pulse  66   02/20/18 19:14


 


Resp  16   02/20/18 19:35


 


BP  101/59 L  02/20/18 19:14


 


Pulse Ox  99   02/20/18 19:14














- Medical History


PMH: Anxiety, Bipolar Disorder, Depression, Schizophrenia





- [facility] Procedures








FAMILY PSYCHOTHERAPY (03/10/17)


GROUP PSYCHOTHERAPY (09/16/17)


INDIVIDUAL PSYCHOTHERAPY, COGNITIVE-BEHAVIORAL (09/16/17)


INDIVIDUAL PSYCHOTHERAPY, SUPPORTIVE (09/16/17)


MEDICATION MANAGEMENT (03/10/17)


PSYCHIA INTERV/EVAL NEC (06/16/15)








Family History: States: No Known Family Hx





- Social History


Hx Tobacco Use: No


Hx Alcohol Use: No


Hx Substance Use: No





- Immunization History


Hx Tetanus Toxoid Vaccination: No


Hx Influenza Vaccination: No


Hx Pneumococcal Vaccination: No





Review Of Systems


Except As Marked, All Systems Reviewed And Found Negative.


Cardiovascular: Negative for: Chest Pain


Respiratory: Negative for: Shortness of Breath


Gastrointestinal: Negative for: Nausea, Vomiting


Psych: Negative for: Suicidal ideation





Physical Exam





- Physical Exam


Appears: Non-toxic, No Acute Distress


Skin: Warm, Dry, No Rash


Head: Atraumatic, Normacephalic


Eye(s): bilateral: Normal Inspection, PERRL, EOMI


Oral Mucosa: Moist


Cardiovascular: Rhythm Regular, No Murmur


Respiratory: Normal Breath Sounds, No Rales, No Rhonchi, No Wheezing


Neurological/Psych: Oriented x3, Normal Speech, Normal Motor





ED Course And Treatment


O2 Sat by Pulse Oximetry: 98 (RA)


Pulse Ox Interpretation: Normal





Disposition





- Disposition


Disposition: HOSPITALIZED


Disposition Time: 18:50


Condition: STABLE





- Clinical Impression


Clinical Impression: 


 Schizoaffective disorder








- Scribe Statement


The provider has reviewed the documentation as recorded by the Jarrett Flor


Provider Attestation: 


All medical record entries made by the Scribe were at my direction and 

personally dictated by me. I have reviewed the chart and agree that the record 

accurately reflects my personal performance of the history, physical exam, 

medical decision making, and the department course for this patient. I have 

also personally directed, reviewed, and agree with the discharge instructions 

and disposition. [employer] [facility]

## 2024-01-23 NOTE — PCM.BM
<Karina Calvin - Last Filed: 09/16/17 19:19>





Treatment Plan Problems





- Problems identified on initial assessmt


  ** Depression


Date Initiated: 09/16/17


Time Initiated: 19:19


Assessment reference: NA


Status: Active





Treatment assets and liabiliti


Patient Assests: adapts well, resourceful, ADL independent, physically healthy, 

good past tx response





- Milieu Protocol


Maintain good personal hygiene: daily Encourage regular showers, daily Remind 

patient to perform daily oral care


Conduct patient checks and document Observation sheet: Q15 minutes


Maintain personal safety: every shift Educate patient to report safety concerns 

to staff, every shift Monitor environment for contraband/sharps


Medication safety: Monitor for expected outcome, potential side effects: every 

shift, Assess barriers to learning: every shift, Assess readiness for 

medication education: every shift





<Reyna Cline - Last Filed: 09/18/17 11:15>





Family Contact


Family involvement: Family/SO is involved


Family contact name: Mahi Duncan-mother


Family contacted how many times per week?: 2





- Goals for Treatment


Patient goals for treatment: "I don't know."





Discharge/Continuing Care





- Education Needs


Education Needs: Patient Medication, Patient Coping Skills





- Discharge


Discharge Criteria: Tolerates medication w/o severe side effects, Free of 

Suicidal thoughts


Discharge to:: Home, With Family





- Treatment Team Participation


Discussed with Family/SO: Yes


Was Patient/Family/SO present at Treatment Team Meeting: Yes





<Yuridia Lipscomb - Last Filed: 09/18/17 11:18>





- Diagnosis


(1) Schizoaffective disorder, bipolar type


Status: Acute   


Interventions: 





09/18/17 11:17


* Assess/adjust medications daily and /or as needed


* See patient on an individual basis 7x/week to assess status of hallucinations


* Discuss risks, benefits, side effects and alternatives of medications


*
<Reyna Cline - Last Filed: 09/25/17 10:20>





Treatment Plan Problems





- Problems identified on initial assessmt


  ** Depression


Date Initiated: 09/16/17


Time Initiated: 19:19


Assessment reference: NA


Status: Active





Treatment assets and liabiliti


Patient Assests: adapts well, resourceful, ADL independent, physically healthy, 

good past tx response





- Milieu Protocol


Maintain good personal hygiene: daily Encourage regular showers, daily Remind 

patient to perform daily oral care


Conduct patient checks and document Observation sheet: Q15 minutes


Maintain personal safety: every shift Educate patient to report safety concerns 

to staff, every shift Monitor environment for contraband/sharps


Medication safety: Monitor for expected outcome, potential side effects: every 

shift, Assess barriers to learning: every shift, Assess readiness for 

medication education: every shift


Milieu Narrative: 








Patient education


Supportive therapy


Continue treatment as before








Family Contact


Family involvement: Family/SO is involved


Family contact name: Mahi Duncan-mother


Family contacted how many times per week?: 2





- Goals for Treatment


Patient goals for treatment: "I don't know."





Discharge/Continuing Care





- Education Needs


Education Needs: Patient Medication, Patient Coping Skills





- Discharge


Discharge Criteria: Tolerates medication w/o severe side effects, Free of 

Suicidal thoughts


Discharge to:: Home, With Family





- Treatment Team Participation


Patient/Family/SO Statement: 








Patient education


Supportive therapy


Continue treatment as before





Discussed with Family/SO: Yes


Was Patient/Family/SO present at Treatment Team Meeting: Yes





Treatment Plan Review


Patient participation: Yes


Family/SO/Caregiver participation: No





- Problem


  ** Depression


Date Initiated: 09/25/17


Time Initiated: 10:21


Progress toward outcomes: improved





- Discharge / Continuing Care


Discharge to:: Home, With Family


Behavioral Health Services: Partial hospital


Health Needs: Follow up care/test, Medications/Rx





<Aruna Farmer - Last Filed: 09/26/17 07:52>





Treatment assets and liabiliti


Patient Assests: cooperative





<Nicole Arroyo - Last Filed: 09/26/17 13:00>





- Diagnosis


(1) Bipolar 1 disorder


Status: Acute   


Interventions: 





09/26/17 13:00


* Assess/adjust medications daily and /or as needed


* See patient on an individual basis 7x/week to assess symptoms of depression


* Monitor for side effects & effectiveness of medications


* 


* Assess/adjust medications daily and /or as needed


* See patient on an individual basis 7x/week to assess level of manic behaviors 

and stability


* Discuss risks, benefits, side effects and alternatives of medications


*
Quality 110: Preventive Care And Screening: Influenza Immunization: Influenza Immunization Administered during Influenza season
Quality 130: Documentation Of Current Medications In The Medical Record: Current Medications Documented
Detail Level: Detailed